# Patient Record
Sex: MALE | Race: WHITE | Employment: FULL TIME | URBAN - METROPOLITAN AREA
[De-identification: names, ages, dates, MRNs, and addresses within clinical notes are randomized per-mention and may not be internally consistent; named-entity substitution may affect disease eponyms.]

---

## 2019-07-23 PROBLEM — Z85.820 PERSONAL HISTORY OF MALIGNANT MELANOMA OF SKIN: Status: ACTIVE | Noted: 2019-07-23

## 2020-01-22 ENCOUNTER — OFFICE VISIT (OUTPATIENT)
Dept: FAMILY MEDICINE CLINIC | Facility: CLINIC | Age: 65
End: 2020-01-22
Payer: COMMERCIAL

## 2020-01-22 VITALS
WEIGHT: 179 LBS | HEART RATE: 64 BPM | SYSTOLIC BLOOD PRESSURE: 128 MMHG | OXYGEN SATURATION: 98 % | HEIGHT: 70 IN | BODY MASS INDEX: 25.62 KG/M2 | DIASTOLIC BLOOD PRESSURE: 70 MMHG | TEMPERATURE: 99 F

## 2020-01-22 DIAGNOSIS — J40 BRONCHITIS: Primary | ICD-10-CM

## 2020-01-22 PROCEDURE — 99202 OFFICE O/P NEW SF 15 MIN: CPT | Performed by: NURSE PRACTITIONER

## 2020-01-22 NOTE — PATIENT INSTRUCTIONS
Bronchitis, Antibiotic Treatment (Adult)    Bronchitis is an infection of the air passages (bronchial tubes) in your lungs. It often occurs when you have a cold.  This illness is contagious during the first few days and is spread through the air by coughi Follow up with your healthcare provider, or as advised. If you had an X-ray or ECG (electrocardiogram), a specialist will review it. You will be told of any new test results that may affect your care.   If you are age 72 or older, if you smoke, or if you ha Acute bronchitis almost always starts as a viral respiratory infection, such as a cold or the flu. Certain factors make it more likely for a cold or flu to turn into bronchitis.  These include being very young, being elderly, having a heart or lung problem, Follow up with your doctor as you are told. You will likely feel better in a week or two. But a dry cough can linger beyond that time.  Let your doctor know if you still have symptoms (other than a dry cough) after 2 weeks, or if you’re prone to getting bro

## 2020-01-22 NOTE — PROGRESS NOTES
CHIEF COMPLAINT:   Patient presents with:  Cold: dry cough pain in chest when coughing, x 4 days. fever of 100.7  since last night. HPI:   Bishop Medel is a 60year old male who presents for cough for  4  days.   Cough started gradually and is de GENERAL: well developed, well nourished,in no apparent distress  SKIN: no rashes, no suspicious lesions  EYES: Conjunctiva clear. No scleral icterus. HENT: Atraumatic, normocephalic. TM's clear bilaterally.   Nostrils patent, nasal mucosa pink and non-in · If your symptoms are severe, rest at home for the first 2 to 3 days. When you go back to your usual activities, don't let yourself get too tired. · Don't smoke. Also stay away from secondhand smoke.   · You may use over-the-counter medicines to control f · Weakness, drowsiness, headache, facial pain, ear pain, or a stiff neck  Call 911  Call 911 if any of these occur.   · Coughing up blood  · Weakness, drowsiness, headache, or stiff neck that get worse  · Trouble breathing, wheezing, or pain with breathing Treating acute bronchitis  Bronchitis usually clears up as the cold or flu goes away. You can help feel better faster by doing the following:  · Take medicine as directed. You may be told to take ibuprofen or other over-the-counter medicines.  These help re The patient indicates understanding of these issues and agrees to the plan. The patient is asked to return if sx's persist or worsen.

## 2021-05-27 ENCOUNTER — APPOINTMENT (OUTPATIENT)
Dept: GENERAL RADIOLOGY | Facility: HOSPITAL | Age: 66
DRG: 065 | End: 2021-05-27
Attending: EMERGENCY MEDICINE
Payer: COMMERCIAL

## 2021-05-27 ENCOUNTER — APPOINTMENT (OUTPATIENT)
Dept: CT IMAGING | Facility: HOSPITAL | Age: 66
DRG: 065 | End: 2021-05-27
Payer: COMMERCIAL

## 2021-05-27 ENCOUNTER — HOSPITAL ENCOUNTER (INPATIENT)
Facility: HOSPITAL | Age: 66
LOS: 7 days | Discharge: NURSING, REHAB OR LONG-TERM CARE | DRG: 065 | End: 2021-06-03
Attending: EMERGENCY MEDICINE | Admitting: HOSPITALIST
Payer: COMMERCIAL

## 2021-05-27 ENCOUNTER — APPOINTMENT (OUTPATIENT)
Dept: CT IMAGING | Facility: HOSPITAL | Age: 66
DRG: 065 | End: 2021-05-27
Attending: EMERGENCY MEDICINE
Payer: COMMERCIAL

## 2021-05-27 DIAGNOSIS — I16.1 HYPERTENSIVE EMERGENCY: Primary | ICD-10-CM

## 2021-05-27 DIAGNOSIS — I61.0 THALAMIC HEMORRHAGE (HCC): ICD-10-CM

## 2021-05-27 PROCEDURE — 99223 1ST HOSP IP/OBS HIGH 75: CPT | Performed by: HOSPITALIST

## 2021-05-27 PROCEDURE — 70496 CT ANGIOGRAPHY HEAD: CPT | Performed by: EMERGENCY MEDICINE

## 2021-05-27 PROCEDURE — 99291 CRITICAL CARE FIRST HOUR: CPT | Performed by: NURSE PRACTITIONER

## 2021-05-27 PROCEDURE — 71045 X-RAY EXAM CHEST 1 VIEW: CPT | Performed by: EMERGENCY MEDICINE

## 2021-05-27 PROCEDURE — 70498 CT ANGIOGRAPHY NECK: CPT | Performed by: EMERGENCY MEDICINE

## 2021-05-27 PROCEDURE — 70450 CT HEAD/BRAIN W/O DYE: CPT

## 2021-05-27 RX ORDER — DOCUSATE SODIUM 100 MG/1
100 CAPSULE, LIQUID FILLED ORAL 2 TIMES DAILY PRN
Status: DISCONTINUED | OUTPATIENT
Start: 2021-05-27 | End: 2021-06-03

## 2021-05-27 RX ORDER — BISACODYL 10 MG
10 SUPPOSITORY, RECTAL RECTAL
Status: DISCONTINUED | OUTPATIENT
Start: 2021-05-27 | End: 2021-06-03

## 2021-05-27 RX ORDER — ONDANSETRON 2 MG/ML
4 INJECTION INTRAMUSCULAR; INTRAVENOUS EVERY 6 HOURS PRN
Status: DISCONTINUED | OUTPATIENT
Start: 2021-05-27 | End: 2021-06-03

## 2021-05-27 RX ORDER — ACETAMINOPHEN 650 MG/1
650 SUPPOSITORY RECTAL EVERY 4 HOURS PRN
Status: DISCONTINUED | OUTPATIENT
Start: 2021-05-27 | End: 2021-06-03

## 2021-05-27 RX ORDER — LORAZEPAM 2 MG/ML
1 INJECTION INTRAMUSCULAR
Status: DISCONTINUED | OUTPATIENT
Start: 2021-05-27 | End: 2021-06-03

## 2021-05-27 RX ORDER — PHENYLEPHRINE HCL IN 0.9% NACL 50MG/250ML
PLASTIC BAG, INJECTION (ML) INTRAVENOUS CONTINUOUS PRN
Status: DISCONTINUED | OUTPATIENT
Start: 2021-05-27 | End: 2021-06-03

## 2021-05-27 RX ORDER — HYDRALAZINE HYDROCHLORIDE 20 MG/ML
10 INJECTION INTRAMUSCULAR; INTRAVENOUS EVERY 2 HOUR PRN
Status: DISCONTINUED | OUTPATIENT
Start: 2021-05-27 | End: 2021-06-03

## 2021-05-27 RX ORDER — METOCLOPRAMIDE HYDROCHLORIDE 5 MG/ML
10 INJECTION INTRAMUSCULAR; INTRAVENOUS EVERY 8 HOURS PRN
Status: DISCONTINUED | OUTPATIENT
Start: 2021-05-27 | End: 2021-06-03

## 2021-05-27 RX ORDER — HYDROMORPHONE HYDROCHLORIDE 1 MG/ML
0.5 INJECTION, SOLUTION INTRAMUSCULAR; INTRAVENOUS; SUBCUTANEOUS EVERY 30 MIN PRN
Status: ACTIVE | OUTPATIENT
Start: 2021-05-27 | End: 2021-05-27

## 2021-05-27 RX ORDER — SODIUM CHLORIDE 9 MG/ML
INJECTION, SOLUTION INTRAVENOUS CONTINUOUS
Status: DISCONTINUED | OUTPATIENT
Start: 2021-05-27 | End: 2021-05-28

## 2021-05-27 RX ORDER — LABETALOL HYDROCHLORIDE 5 MG/ML
10 INJECTION, SOLUTION INTRAVENOUS EVERY 10 MIN PRN
Status: DISCONTINUED | OUTPATIENT
Start: 2021-05-27 | End: 2021-06-03

## 2021-05-27 RX ORDER — ACETAMINOPHEN 325 MG/1
650 TABLET ORAL EVERY 4 HOURS PRN
Status: DISCONTINUED | OUTPATIENT
Start: 2021-05-27 | End: 2021-06-03

## 2021-05-27 RX ORDER — ONDANSETRON 2 MG/ML
4 INJECTION INTRAMUSCULAR; INTRAVENOUS EVERY 4 HOURS PRN
Status: DISCONTINUED | OUTPATIENT
Start: 2021-05-27 | End: 2021-05-27

## 2021-05-27 RX ORDER — POLYETHYLENE GLYCOL 3350 17 G/17G
17 POWDER, FOR SOLUTION ORAL DAILY PRN
Status: DISCONTINUED | OUTPATIENT
Start: 2021-05-27 | End: 2021-06-03

## 2021-05-28 ENCOUNTER — APPOINTMENT (OUTPATIENT)
Dept: CT IMAGING | Facility: HOSPITAL | Age: 66
DRG: 065 | End: 2021-05-28
Attending: EMERGENCY MEDICINE
Payer: COMMERCIAL

## 2021-05-28 ENCOUNTER — APPOINTMENT (OUTPATIENT)
Dept: CV DIAGNOSTICS | Facility: HOSPITAL | Age: 66
DRG: 065 | End: 2021-05-28
Attending: EMERGENCY MEDICINE
Payer: COMMERCIAL

## 2021-05-28 PROCEDURE — 99291 CRITICAL CARE FIRST HOUR: CPT | Performed by: INTERNAL MEDICINE

## 2021-05-28 PROCEDURE — 70450 CT HEAD/BRAIN W/O DYE: CPT | Performed by: NURSE PRACTITIONER

## 2021-05-28 PROCEDURE — 93306 TTE W/DOPPLER COMPLETE: CPT | Performed by: NURSE PRACTITIONER

## 2021-05-28 PROCEDURE — 99233 SBSQ HOSP IP/OBS HIGH 50: CPT | Performed by: HOSPITALIST

## 2021-05-28 RX ORDER — LISINOPRIL 10 MG/1
10 TABLET ORAL DAILY
Status: DISCONTINUED | OUTPATIENT
Start: 2021-05-28 | End: 2021-06-03

## 2021-05-28 RX ORDER — POTASSIUM CHLORIDE 20 MEQ/1
40 TABLET, EXTENDED RELEASE ORAL ONCE
Status: COMPLETED | OUTPATIENT
Start: 2021-05-28 | End: 2021-05-28

## 2021-05-28 RX ORDER — ATORVASTATIN CALCIUM 10 MG/1
10 TABLET, FILM COATED ORAL NIGHTLY
Status: DISCONTINUED | OUTPATIENT
Start: 2021-05-28 | End: 2021-06-03

## 2021-05-28 RX ORDER — ENOXAPARIN SODIUM 100 MG/ML
40 INJECTION SUBCUTANEOUS DAILY
Status: DISCONTINUED | OUTPATIENT
Start: 2021-05-28 | End: 2021-06-03

## 2021-05-28 NOTE — CONSULTS
[facility]  Neurocritical Care Consult Note    Nasir Chandler Patient Status:  Inpatient    1955 MRN [id number]   [facility] 6NE-A Attending Fabricio Alvarado MD   Hosp Day # 1 PCP Keiko Duque MD       Reason for C mg, 650 mg, Rectal, Q4H PRN  docusate sodium (COLACE) cap 100 mg, 100 mg, Oral, BID PRN  ondansetron HCl (ZOFRAN) injection 4 mg, 4 mg, Intravenous, Q6H PRN   Or  Metoclopramide HCl (REGLAN) injection 10 mg, 10 mg, Intravenous, Q8H PRN  LORazepam (ATIVAN) droop  · Motor- 5/5 on L, RUE 3/5, RLE 4/5  · Sens-  Dec to light touch on R    Diagnostics:   CT BRAIN OR HEAD (29592)    Result Date: 5/28/2021  CONCLUSION:  1.  No interval change in the size or appearance of an acute hemorrhage identified centered in th vertebral or carotid arteries. No evidence of hemodynamically significant carotid stenosis by NASCET criteria. Please see above for further details. Critical results were discussed with Dr. Viridiana Hankins at [facility] on 5/27/2021.  Critical results were read

## 2021-05-28 NOTE — PROGRESS NOTES
[facility]  Neurocritical Care APRN Progress Note    NAME: Jose Antonio Dhillon - ROOM: 66/5687-R - MRN: [id number] - Age: 63year old - [facility]:8/[birth year]/6950    History Of Present Illness:  Jose Antonio Dhillon is a 63year old male with PMHx significant some mild continued left arm soreness but no other issues after.      PMH:  Past Medical History:   Diagnosis Date   • Basal cell carcinoma 2009    right chest   • Dysplastic nevus 11/15/13    right back   • Dysplastic nevus 2/23/16    right upper back   • bilaterally. The rest of the cranial nerves appear grossly intact. Sensation:  Able to feel light touch bilaterally but diminished on right side, most notable in right arm.   Motor: RUE drift not fully to the bed, RLE mild drift but able to hold off bed, 5/27/2021 at 7:38 PM     Finalized by (CST): Jazmine Kwok MD on 5/27/2021 at 7:43 PM       Labs:  Lab Results   Component Value Date    WBC 8.1 05/27/2021    HGB 15.1 05/27/2021    HCT 44.4 05/27/2021    .0 05/27/2021    CREATSERUM 1.23 05/27/202 [facility]: 94595  [facility]: 6468    A total of 35 minutes of critical care time (exclusive of billable procedures) was administered.  This involved direct patient intervention, complex decision making, and/or extensive discussions with the patient, family,

## 2021-05-28 NOTE — PAYOR COMM NOTE
--------------  ADMISSION REVIEW     Payor: [address] #:  [id number]  Authorization Number: [id number]    Admit date: 5/27/21  Admit time:  9:17 PM     Patient Seen in: [facility] Emergency Department    History against gravity, he is able to hold his right leg against gravity but with great difficulty, no speech or visual disturbance     Labs Reviewed   PTT, ACTIVATED - Abnormal; Notable for the following components:       Result Value    PTT 24.0 (*)     All oth slowly bring his blood pressure down and while in the emergency department we will keep his systolics close to 711. Patient was reexamined multiple times and shows no progression. [facility]   Patient was s MCV 87.6   .0   INR 0.93     PTP 12.8   INR 0.93     ASSESSMENT / PLAN:     1. Left thalamic bleed secondary to hypertensive emergency  1. Stroke protocol  2. Neuro and Neuro Sx on consult  3. ECHO  4. BP control  5. Avoid ASA/ anticoagulants  6. Talha Borrego RN    5/27/2021 1938 Rate/Dose Change 7.5 mg/hr Intravenous Julia Diaz RN    5/27/2021 1922 New Bag 20,000 mcg (none[facility]      0.9% NaCl infusion     Date Action Dose Route User    5/28/2021 0000 Rate/Dose Verify (non

## 2021-05-28 NOTE — OCCUPATIONAL THERAPY NOTE
OCCUPATIONAL THERAPY EVALUATION - INPATIENT     Room Number: 0164/3466-B  Evaluation Date: 5/28/2021  Type of Evaluation: Initial  Presenting Problem: L thalamic ICH    Physician Order: IP Consult to Occupational Therapy  Reason for Therapy: ADL/IADL Dysfu COGNITION  Initiation: hand over hand to initiate tasks  Motor Planning: impaired  Awareness of Errors:  assistance required to identify errors made and assistance required to correct errors made  Awareness of Deficits:  decreased awareness of deficits Rest: 97    ACTIVITIES OF DAILY LIVING ASSESSMENT  AM-PAC ‘6-Clicks’ Inpatient Daily Activity Short Form  How much help from another person does the patient currently need…  -   Putting on and taking off regular lower body clothing?: A Lot  -   Bathing (in used simple words to communicate most of the session. Word finding difficulty. Chair alarm on. Encouraged the pt to use L hand to guide R hand during simple ADL (reaching for a cup, etc). Promoted the importance of bilateral integration.   Pt and his cristhian Decision Making HIGH - Analysis of occupational profile, comprehensive assessments, multiple treatment options    Overall Complexity MODERATE     OT Discharge Recommendations: Acute rehabilitation  OT Device Recommendations: TBD    PLAN  OT Treatment Plan:

## 2021-05-28 NOTE — CONSULTS
[address] Patient Status:  Inpatient    1955 MRN [id number]   [facility] 6NE-A Attending Devaughn Koch MD   Hosp Day # 1 PCP Marina Rosales MD     Patient Identification  Angie Stovall is a 63year old male. not be accurately estimated. Follow up CT 5/28/21 CONCLUSION:     1.  No interval change in the size or appearance of an acute hemorrhage identified centered in the left thalamus with surrounding low-density edema. Sharlee Thorpe is mild local mass effect upon (TYLENOL) 650 MG rectal suppository 650 mg, 650 mg, Rectal, Q4H PRN  docusate sodium (COLACE) cap 100 mg, 100 mg, Oral, BID PRN  ondansetron HCl (ZOFRAN) injection 4 mg, 4 mg, Intravenous, Q6H PRN   Or  Metoclopramide HCl (REGLAN) injection 10 mg, 10 mg, I data filed at 5/28/2021 1130  Gross per 24 hour   Intake 1556 ml   Output 1875 ml   Net -319 ml       Physical Exam:                                      General: Alert, cooperative, no distress, appears stated age.   Head:  Normocephalic, with facial droop management, bleeding risk,  prevention of pressure ulcer, prevention of aspiration pneumonia. Patient would benefit and is able to participate in 3 hours of therapy daily.  Patient is anticipated to discharge to home when acute inpatient rehabilitation

## 2021-05-28 NOTE — PROGRESS NOTES
[facility]  Progress Note     Dakota Carreno Patient Status:  Inpatient    1955 MRN [id number]   [facility] 6NE-A Attending Diana Hernandez MD   Hosp Day # 1 PCP Clarke Nichole MD     Chief Complaint: elevated blood pressure 05/27/21  1858   PTP 12.8   INR 0.93            COVID-19 Lab Results    COVID-19  Lab Results   Component Value Date    COVID19 Not Detected 05/27/2021       Pro-Calcitonin  No results for input(s): PCT in the last 168 hours.     Cardiac  Recent Labs   Lab

## 2021-05-28 NOTE — SLP NOTE
ADULT SWALLOWING EVALUATION    ASSESSMENT    ASSESSMENT/OVERALL IMPRESSION:  Patient seen for swallowing evaluation per stroke protocol. Patient admitted after developing right sided weakness while exercising. Imaging revealed L thalamic hemorrhage.   His Expressive aphasia      Past Medical History  Past Medical History:   Diagnosis Date   • Basal cell carcinoma 2009    right chest   • Dysplastic nevus 11/15/13    right back   • Dysplastic nevus 2/23/16    right upper back   • Melanoma in situ [facility]) 3/26/2 Phase of Swallow: No complaints consistent with possible esophageal involvement            GOALS  Goal #1 Patient will participate in communication assessment  In Progress   Goal #2 Patient will participate in ADRS    In [address] [place] [address]

## 2021-05-28 NOTE — ED PROVIDER NOTES
Patient Seen in: [facility] Emergency Department      History   Patient presents with:  Stroke    Stated Complaint: Stroke     HPI/Subjective:   HPI    28-year-old male presents to the emerge department with sudden onset of right upper extremity wea Chaperone present: Nurses at bedside. Constitutional:       General: He is not in acute distress. Appearance: Normal appearance. He is well-developed. HENT:      Head: Normocephalic and atraumatic.    Cardiovascular:      Rate and Rhythm: Normal rat ------                     CBC W/ DIFFERENTIAL[[id number]]                              Final result                 Please view results for these tests on the individual orders.    RAPID SARS-COV-2 BY PCR   CBC W/ DIFFERENTIAL     EKG    Rate, intervals and at 7:12 PM     Finalized by (CST): Yessica Grande MD on 5/27/2021 at 7:13 PM       CT STROKE CTA BRAIN/CTA NECK (W IV)(CPT=70496/44345)    Result Date: 5/27/2021  PROCEDURE:  CT STROKE CTA BRAIN/CTA NECK (W IV)(CPT=70496/96522)  COMPARISON:  None.   Rachel Chandler unremarkable. The common carotid arteries are widely patent. Mild mixed plaque in both carotid bulbs. There is no evidence of hemodynamically significant stenosis in the carotid bulbs by NASCET criteria.   The cervical internal carotid arteries are widel and shows no progression. I contacted the [facility] as well as neurosurgery. Patient was seen by the [facility] in the emergency department. I reviewed all the findings with the patient as well.   Patient will be admitted to the neuro IC

## 2021-05-28 NOTE — CM/SW NOTE
PT recommending acute rehab. PMR consult placed. Await determination. Referrals to in network acute rehabs sent via Aidin.

## 2021-05-28 NOTE — CONSULTS
[facility]  Neurosurgery Consult    Ramy Lawson Patient Status:  Inpatient    1955 MRN [id number]   [facility] 6NE-A Attending Lena Dey MD   Hosp Day # 1 PCP Magy Triplett MD     REASON FOR CONSULTATION:  L thalamic IPH (TYLENOL) 650 MG rectal suppository 650 mg, 650 mg, Rectal, Q4H PRN  docusate sodium (COLACE) cap 100 mg, 100 mg, Oral, BID PRN  ondansetron HCl (ZOFRAN) injection 4 mg, 4 mg, Intravenous, Q6H PRN   Or  Metoclopramide HCl (REGLAN) injection 10 mg, 10 mg, I 05/27/2021    PGLU 93 05/28/2021       IMAGING:  CT brain 5/27 1908  FINDINGS:   Ventricles and sulci are within normal limits.  There is no midline shift.  The basal cisterns are patent.  The gray-white matter differentiation is intact.       There is acu consistent with underlying chronic   microvascular ischemic change. SINUSES:           No sign of acute sinusitis.     MASTOIDS:          No sign of acute inflammation. SKULL:             No evidence for fracture or osseous abnormality.    OTHER:

## 2021-05-28 NOTE — PHYSICAL THERAPY NOTE
PHYSICAL THERAPY EVALUATION - INPATIENT     Room Number: 3992/2576-H  Evaluation Date: 5/28/2021  Type of Evaluation: Initial  Physician Order: PT Eval and Treat    Presenting Problem: L thalamic hematoma  Reason for Therapy: Mobility Dysfunction and Tearful at times. Patient self-stated goal is - to regain strength on R side.     OBJECTIVE  Precautions: Seizure;Bed/chair alarm ( to 150)  Fall Risk: High fall risk    WEIGHT BEARING RESTRICTION  Weight Bearing Restriction: None                P bedclothes, sheets and blankets)?: A Little   -   Sitting down on and standing up from a chair with arms (e.g., wheelchair, bedside commode, etc.): A Lot   -   Moving from lying on back to sitting on the side of the bed?: A Little   How much help from anot Pt completed gait once again outside of room 12'x1 w/ mod a of 2 and chair follow. Pt w/ narrow maurice, flexed knees and b foot drag. Each step w/ r le was unpredictable - at times to large a step other times a drag. Motor control difficult on R>L.     Exerc assistance     Goal #3 Patient is able to ambulate 50 feet with assist device: walker - rolling at assistance level: moderate assistance     Goal #4    Goal #5    Goal #6    Goal Comments: Goals established on 5/28/2021

## 2021-05-28 NOTE — H&P
[facility]  History and Physical     Mikey Jeffries Patient Status:  Emergency    1955 MRN [id number]   Location [address] [facility] Attending Dana Nolan MD   Hosp Day # 0 PCP Caterina Jiménez MD     Chief Complaint: th No acute distress. Alert and oriented x 3. HEENT: Normocephalic atraumatic. Moist mucous membranes. EOM-I. PERRLA. Anicteric. Neck: No lymphadenopathy. No JVD. No carotid bruits. Respiratory: Clear to auscultation bilaterally. No wheezes. No rhonchi.   C Marilia Barkley MD  5/27/2021

## 2021-05-28 NOTE — PLAN OF CARE
Assumed patient care this am. Patient alert, oriented x4. Right side weakness and decreased sensation on right side. Right facial droop and expressive aphasia. Up in halls and up to chair with PT/OT, assist x2.  Patient woken up at 1400 for neuro exam and d

## 2021-05-28 NOTE — ED QUICK NOTES
Unable to start IV x3 at launch pad, brought to CT for plain brain and ultrasound guidied IV placed to L upper arm afterwards for rest of imaging.  Pt having increasing dysarthria while going to CT

## 2021-05-28 NOTE — CM/SW NOTE
Spoke to patients spouse re: acute rehab recommendation. She would like to have the available facility list emailed to [email or web address]

## 2021-05-29 PROCEDURE — 99291 CRITICAL CARE FIRST HOUR: CPT | Performed by: INTERNAL MEDICINE

## 2021-05-29 PROCEDURE — 99232 SBSQ HOSP IP/OBS MODERATE 35: CPT | Performed by: HOSPITALIST

## 2021-05-29 NOTE — PHYSICAL THERAPY NOTE
PHYSICAL THERAPY TREATMENT NOTE - INPATIENT    Room Number: 7642/1614-J     Session:1  Number of Visits to Meet Established Goals: 5    Presenting Problem: L thalamic hematoma    Problem List  Principal Problem:    Thalamic hemorrhage ([address]  Active Proble (AM-PAC Scale): 40.78   CMS Modifier (G-Code): CK    FUNCTIONAL ABILITY STATUS  Gait Assessment   Gait Assistance: Maximum assistance (2PA)  Distance (ft): 25,15  Assistive Device: Rolling walker  Pattern: R Foot drag;R Flexed knee; Ataxic  Stoop/Curb [employer] Goal #1 Patient is able to demonstrate supine - sit EOB @ level: supervision      Goal #2 Patient is able to demonstrate transfers EOB to/from [facility] at assistance level: minimum assistance      Goal #3 Patient is able to ambulate 50 feet with assist device

## 2021-05-29 NOTE — PLAN OF CARE
Assumed care at 1230. Pt tx from CCU to 7625. Pt A/O x4 with expressive aphasia. RA. NSR on tele. VSS. Denies any pain. Neuro checks q4, see flow sheets. Up x1, stand/pivot to the chair. Walked with PT in the dyson, PT recommending Acute rehab.  Pt family at

## 2021-05-29 NOTE — SLP NOTE
SPEECH/LANGUAGE/COGNITIVE EVALUATION - INPATIENT    Admission Date: 5/27/2021  Evaluation Date: 05/29/21    Reason for Referral: Stroke protocol    ASSESSMENT & PLAN   ASSESSMENT & IMPRESSION   Patient is a 63year old male who sustained a Thalamic hemorrh production strategies training to improve safety, independence and for return to previous level of communication. Acute rehabilitation is recommended upon discharge. Further, more in-depth assessment of language skills is recommended at next level of care. rehabilitation    Patient Experiencing Pain: No                           GOALS  Goal #1 Patient will be educated on word-retrieval strategies and express moderately-complex word tasks with 80% acc given mod (A)  Goal Established       Goal #2 Evaluate jagdish

## 2021-05-29 NOTE — PROGRESS NOTES
[facility]  Progress Note     Deondre Hardin Patient Status:  Inpatient    1955 MRN [id number]   [facility] 6NE-A Attending Pasquale Ghotra MD   Hosp Day # 2 PCP Monty Melendez MD     Chief Complaint: elevated blood pressure COVID-19 Lab Results    COVID-19  Lab Results   Component Value Date    COVID19 Not Detected 05/27/2021       Pro-Calcitonin  No results for input(s): PCT in the last 168 hours.     Cardiac  Recent Labs   Lab 05/27/21  1858   TROP <0.045       Creatinin

## 2021-05-29 NOTE — PLAN OF CARE
Assumed care of patient at 1900, patient alert and orientated x4 with some expressive aphasia. Pt sitting up in chair, patient back to bed with 2 RN assist, gait belt and turn and pivot only. Neuro checks q 4 hours. Able to follow commands, vital stable.

## 2021-05-29 NOTE — PROGRESS NOTES
Received patient at 0730  A/O x 4  Expressive aphasia and r sided weakness, and decreased sensation  Slight R facial droop  Patient up to chair with assistance  [facility] 8  Patient eating/drinking  Transfer orders in place  Report called to Jaya Love on CTU 7

## 2021-05-29 NOTE — PROGRESS NOTES
[facility]  Neurocritical Care Progress Note     Rachel Kendall Patient Status:  Inpatient    1955 MRN [id number]   [facility] 6NE-A Attending Annie Chang MD   Hosp Day # 2 PCP Neo Matta MD     Subjective: suppository 10 mg, 10 mg, Rectal, Daily PRN      Physical Examination:   General- No acute distress  CV- RRR  Resp- CTA Bilat  Neuro-  · Mental status- awake and alert, regards and follows commands, oriented x3, speech dysarthric, naming 3/3  · Cranial ner

## 2021-05-30 PROCEDURE — 99232 SBSQ HOSP IP/OBS MODERATE 35: CPT | Performed by: HOSPITALIST

## 2021-05-30 PROCEDURE — 99233 SBSQ HOSP IP/OBS HIGH 50: CPT | Performed by: OTHER

## 2021-05-30 NOTE — PLAN OF CARE
Assumed pt care at 0730  VSS, A&Ox4  Neuros Q4, right sided weakness, expressive aphasia  Up in chair  DC planning, patient preferring Thermon Patricia- insurance auth to be sent on Tuesday  POC discussed with family and pt  Needs attended to

## 2021-05-30 NOTE — PLAN OF CARE
Assumed pt care at [address] [place] [address] for the night shift. Pt resting in bed in no apparent distress. Neuro assessment unchanged. VSS. Low grade temp overnight. Encouraged deep breathing and coughing. Voids per urinal.Remains on bedrest.  All safety precautions in place.

## 2021-05-30 NOTE — PROGRESS NOTES
[facility]  Progress Note     Nasir Chandler Patient Status:  Inpatient    1955 MRN [id number]   [facility] 6NE-A Attending Fabricio Alvarado MD   [facility] Day # 3 PCP Keiko Duque MD     Chief Complaint: elevated blood pressure in this interval not displayed. Estimated Creatinine Clearance: 58.4 mL/min (based on SCr of 1.21 mg/dL).     Recent Labs   Lab 05/27/21  1858   PTP 12.8   INR 0.93            COVID-19 Lab Results    COVID-19  Lab Results   Component Value Date    COV

## 2021-05-30 NOTE — PROGRESS NOTES
[id number] [address] Neurology Progress Note    [facility] Patient Status:  Inpatient    1955 MRN [id number]   [facility] 7NE-A Attending Ginny Peña MD   [facility] Day # 3 PCP Lashanda Cortes MD       [facility]      Neurology did have a moment of perseverating on what a watch is for when answering what a pen is for, but was able to correct himself. No slurred speech noted. Able to follow simple commands. Face is symmetrical. PERRL. EOMI. VFF. Tongue midline.  Shoulder shrug n goal 100-150mmHg  · PT/OT recommends Acute Rehab  · NS followed  · HTN    Patient with left thalamic ICH in setting of elevated BP. Plan for Acute Rehab on DC and SW to speak with family. Dr. Brandin Oates to follow.     EUGENE Reno Huge

## 2021-05-30 NOTE — CM/SW NOTE
MSW spoke with the patient's wife Sultana Raines who informed MSW she would like Heavy. MSW reserved via Aidin. MSW informed Von Every from [facility] via [address] to please request Nannette Kaminski first thing Tuesday morning.   The patient's wife would like

## 2021-05-30 NOTE — PROGRESS NOTES
SPEECH DAILY NOTE - INPATIENT    ASSESSMENT & PLAN   ASSESSMENT    The patient was seen for skilled speech therapy as per plan of care. Patient received in room, seated upright in bed. Pleasant and cooperative for the therapy session.  Patient demonstrated tasks with 80% acc given mod (A)  Goal Established         Goal #2 Evaluate reading comprehension     GOAL MET      Goal #3 Evaluate written expression GOAL MET   Goal #4 Patient will complete moderately complex functional reading comprehension activities

## 2021-05-31 PROCEDURE — 99232 SBSQ HOSP IP/OBS MODERATE 35: CPT | Performed by: OTHER

## 2021-05-31 PROCEDURE — 99232 SBSQ HOSP IP/OBS MODERATE 35: CPT | Performed by: HOSPITALIST

## 2021-05-31 NOTE — PROGRESS NOTES
[facility]  Progress Note     Nasir Chandler Patient Status:  Inpatient    1955 MRN [id number]   [facility] 6NE-A Attending Fabricio Alvarado MD   [facility] Day # 4 PCP Keiko Duque MD     Chief Complaint: elevated blood pressure values in this interval not displayed. Estimated Creatinine Clearance: 58.4 mL/min (based on SCr of 1.21 mg/dL).     Recent Labs   Lab 05/27/21  1858   PTP 12.8   INR 0.93            COVID-19 Lab Results    COVID-19  Lab Results   Component Value Date

## 2021-05-31 NOTE — PROGRESS NOTES
Neurology Progress Note    Natacha Cord Patient Status:  Inpatient    1955 MRN [id number]   [facility] 7NE-A Attending Radha Peña MD   [facility] Day # 4 PCP Avery Wolff MD       Chief Complaint:  L ICH hemmorhage      Subjective

## 2021-05-31 NOTE — PLAN OF CARE
Assumed care of pt at 1900. Pt AOx4. RA.  .  NSR. Pt no c/o pain. Neuro Q4. DC planning, pt moises Falcon, insurance auth to be sent Tuesday. Will continue to monitor.

## 2021-06-01 PROCEDURE — 99232 SBSQ HOSP IP/OBS MODERATE 35: CPT | Performed by: HOSPITALIST

## 2021-06-01 RX ORDER — ATORVASTATIN CALCIUM 10 MG/1
10 TABLET, FILM COATED ORAL NIGHTLY
Qty: 90 TABLET | Refills: 0 | Status: SHIPPED | OUTPATIENT
Start: 2021-06-01

## 2021-06-01 RX ORDER — LISINOPRIL 10 MG/1
10 TABLET ORAL DAILY
Qty: 90 TABLET | Refills: 0 | Status: SHIPPED | OUTPATIENT
Start: 2021-06-01 | End: 2021-10-18 | Stop reason: DRUGHIGH

## 2021-06-01 NOTE — PLAN OF CARE
Assumed care at [address]. Denies pain/discomfort. Neuro checks q4h. See charting for full assessment.       Problem: Patient/Family Goals  Goal: Patient/Family Long Term Goal  Description: Patient's Long Term Goal: return to baseline speech and mobility    In call for assistance with activity based on assessment  Outcome: Progressing  Goal: Achieves maximal functionality and self care  Description: INTERVENTIONS  - Monitor swallowing and airway patency with patient fatigue and changes in neurological status  -

## 2021-06-01 NOTE — OCCUPATIONAL THERAPY NOTE
OCCUPATIONAL THERAPY TREATMENT NOTE - INPATIENT     Room Number: 1915/6170-W  Session: 1  Number of Visits to Meet Established Goals: 7    Presenting Problem: L thalamic ICH    History related to current admission:    Patient was admitted from the gym on 5 Putting on and taking off regular upper body clothing?: A Little  -   Taking care of personal grooming such as brushing teeth?: A Little  -   Eating meals?: A Little    AM-PAC Score:  Score: 15  Approx Degree of Impairment: 56.46%  Standardized Score (AM-P to participate in therapy and demonstrates excellent rehab potential.  Recommend acute rehab when stable for hospital discharge.        OT Discharge Recommendations: Acute rehabilitation  OT Device Recommendations: TBD    PLAN  OT Treatment Plan: Balance ac

## 2021-06-01 NOTE — DISCHARGE SUMMARY
[facility] PSYCHIATRIC [place] HOSPITALIST  DISCHARGE SUMMARY     Nasirrhea Smithchacorta Patient Status:  Inpatient    1955 MRN [id number]   [facility] 7NE-A Attending Dr. Diane Mayen Day # 6 PCP Keiko Duque MD     Date of Admission: 2021  Date of Dis from the hospital.     Procedures during hospitalization:  none    Consultants:  • Neurology, neurosurgery.          Discharge Medications        START taking these medications        Instructions Prescription details   atorvastatin 10 MG Tabs  Commonly kno dysarthria. He was found to have left forearm hemorrhage. He was significantly hypertensive on arrival which improved with Cardene drip. BP remained stable on lisinopril. PT/OT evaluated patient and recommend acute rehab.   Strength and dysarthria did i

## 2021-06-01 NOTE — PLAN OF CARE
Assumed care of patient at 0730. Patient resting in bed comfortably. No c/o pain. Patient up to chair. Neuro assessed. Patient and family updated on plan of care. Patient awaiting SRAL placement by SW. Patient requesting SRAL. Needs met.  Will continue to m

## 2021-06-01 NOTE — SLP NOTE
SPEECH DAILY NOTE - INPATIENT    ASSESSMENT & PLAN   ASSESSMENT  Patient seen for communication therapy. Patient verbal expression much improved compared to my initial visit.   He verbalizes frustration with deficits but also acknowledges interval improvem Established Goals: 5    Session: 2 of 5    If you have any questions, please contact Elpidio Hilario CCC-SLP  Pager [phone]

## 2021-06-01 NOTE — CM/SW NOTE
Pt has been cleared for dc and [facility] has insurance auth for pt to come to them for AR. However, pt really wants to go to [facility] for [address] [place]. Asked Yaquelin at [facility] to relinquish the insurance auth so [facility] can submit for it.   Spoke with Citlaly Laguerre at [facility] who said

## 2021-06-01 NOTE — PLAN OF CARE
Assumed care of patient at 0730. Patient resting in bed comfortably. No c/o pain. Patient up to chair. Neuro assessed every 4 hours. Patient and family updated on plan of care. Patient awaiting SRAL placement by DARIUS. Needs met. Will continue to monitor.

## 2021-06-01 NOTE — PROGRESS NOTES
[facility] H[facility]PITAL[facility]  Progress Note     Mian Vang Patient Status:  Inpatient    1955 MRN [id number]   [facility] 6NE-A Attending Marc Horne MD   [facility] Day # 5 PCP Madai Hendrix MD     Chief Complaint: elevated blood pressure --    TP 7.7  --   --   --   --     < > = values in this interval not displayed. Estimated Creatinine Clearance: 58.4 mL/min (based on SCr of 1.21 mg/dL).     Recent Labs   Lab 05/27/21  1858   PTP 12.8   INR 0.93            COVID-19 Lab Results    CO

## 2021-06-01 NOTE — PHYSICAL THERAPY NOTE
PHYSICAL THERAPY TREATMENT NOTE - INPATIENT    Room Number: 4110/9792-P     Session:2  Number of Visits to Meet Established Goals: 5    Presenting Problem: L thalamic hematoma     History related to current admission: Patient was admitted from the gym on bedclothes, sheets and blankets)?: None   -   Sitting down on and standing up from a chair with arms (e.g., wheelchair, bedside commode, etc.): A Little   -   Moving from lying on back to sitting on the side of the bed?: A Little   How much help from [facility] addressed; Alarm set; Family present    ASSESSMENT   Pt demonstrates progress in function and activity tolerance.  Pt continues to have hemiparesis of R LE/UE, impaired balance in standing, decreased endurance, dec coordination, dec motor planning and control

## 2021-06-02 PROCEDURE — 99232 SBSQ HOSP IP/OBS MODERATE 35: CPT | Performed by: INTERNAL MEDICINE

## 2021-06-02 NOTE — OCCUPATIONAL THERAPY NOTE
OCCUPATIONAL THERAPY TREATMENT NOTE - INPATIENT     Room Number: 7274/2732-B  Session: 2     Number of Visits to Meet Established Goals: 7    Presenting Problem: L thalamic ICH    History related to current admission:   Patient was admitted from the gym on which includes using toilet, bedpan or urinal? : A Lot  -   Putting on and taking off regular upper body clothing?: A Little  -   Taking care of personal grooming such as brushing teeth?: A Little  -   Eating meals?: None    AM-PAC Score:  Score: 16  Appro session    Patient End of Session: Up in chair;Needs met;Call light within reach;RN aware of session/findings; All patient questions and concerns addressed; Alarm set    ASSESSMENT   Patient presents with increasing function of RUE.  Patient is limited by josé

## 2021-06-02 NOTE — DIETARY NOTE
[facility]     Admitting diagnosis:  Thalamic hemorrhage ([facility] [address] [I61.0]  Hypertensive emergency [I16.1]    Ht: 170.2 cm (5' 7\")  Wt: 86.6 kg (191 lb). Body mass index is 29.91 kg/m².   IBW: 67.3 kg    Wt Reading

## 2021-06-02 NOTE — PLAN OF CARE
Assumed care at [phone]. Alert and oriented x4. Telemetry monitor reading SR. Neuro checks remain unchanged. No pain. Fall and seizure precautions maintained per protocol. Plan for Rob Vazquez. Pending. Call light in reach at bedside.  Will continue to mo needed  - Reorient patient post seizure  - Seizure pads on all 4 side rails  - Instruct patient/family to notify RN of any seizure activity  - Instruct patient/family to call for assistance with activity based on assessment  Outcome: Progressing  Goal: Ach

## 2021-06-02 NOTE — SLP NOTE
SPEECH DAILY NOTE - INPATIENT    ASSESSMENT & PLAN   ASSESSMENT  Patient seen for communication therapy focusing on expressive language through semantic feature analysis. SLP presented color pictures along with visual template to guide patient in SFA.   Pa 5    If you have any questions, please contact Catracho Lao 87 CCC-SLP  Pager [phone]

## 2021-06-02 NOTE — PLAN OF CARE
Assumed care of patient at 0700  A/Ox4, RA, NSR on tele  Neuros Qshift, no new deficits  Patient obtained insurance auth for [employer]  Need COVID PCR prior to discharge, awaiting results  Denies pain  Patient to transfer by [facility] tomorrow  Patient an status  Outcome: Adequate for Discharge  Goal: Remains free of injury related to seizure activity  Description: INTERVENTIONS:  - Maintain airway, patient safety  and administer oxygen as ordered  - Monitor patient for seizure activity, document and report Adequate for Discharge

## 2021-06-02 NOTE — PROGRESS NOTES
[facility]  Progress Note     Angie Stovall Patient Status:  Inpatient    1955 MRN [id number]   [facility] [place] 6NE-A Attending Jerrod De Paz 41 Day # 6 PCP Marina Rosales MD     Chief Complaint: elevated blood pressure    S: --   --   --    TP 7.7  --   --   --   --     < > = values in this interval not displayed. Estimated Creatinine Clearance: 55.2 mL/min (based on SCr of 1.28 mg/dL).     Recent Labs   Lab 05/27/21  1858   PTP 12.8   INR 0.93            COVID-19 Lab Res

## 2021-06-02 NOTE — PLAN OF CARE
Assumed care of pt.  At 1930  A&O x 4- neuro checks q4hr  Room Air  Right sided weakness  Slight slur of words and expressive aphasia  NSR on telemetry  SBP goal 100-150  No c/o pain  Plan to go to [facility]  Will continue to monitor

## 2021-06-02 NOTE — PHYSICAL THERAPY NOTE
PHYSICAL THERAPY TREATMENT NOTE - INPATIENT    Room Number: 9885/8062-Q     Session: 3   Number of Visits to Meet Established Goals: 5    Presenting Problem: L thalamic hematoma     History related to current admission: Patient was admitted from the gym o Sitting down on and standing up from a chair with arms (e.g., wheelchair, bedside commode, etc.): A Little   -   Moving from lying on back to sitting on the side of the bed?: A Little   How much help from another person does the patient currently need. .. updated with safe dc plan to AR. Standardized Assessment    BP    154/92  161/123    Denied dizziness or nausea with activity.      Axillary Transfers/Environmental Barriers    Toilet/Commode Transfers: Min A  Stairs: NA  Curb Step: NA        Patient En

## 2021-06-02 NOTE — CM/SW NOTE
Bettyjo Frankel from [facility] [place] [phone] said they accepted pt for AR but they need a non-rapid-Covid test which was drawn but the results will not be back until late today or tomorrow morning.   SRAL will not take pt with a rapid Covid or even knowing that h

## 2021-06-03 VITALS
OXYGEN SATURATION: 100 % | DIASTOLIC BLOOD PRESSURE: 71 MMHG | SYSTOLIC BLOOD PRESSURE: 114 MMHG | HEIGHT: 67 IN | HEART RATE: 60 BPM | WEIGHT: 191 LBS | RESPIRATION RATE: 16 BRPM | TEMPERATURE: 98 F | BODY MASS INDEX: 29.98 KG/M2

## 2021-06-03 PROCEDURE — 99239 HOSP IP/OBS DSCHRG MGMT >30: CPT | Performed by: INTERNAL MEDICINE

## 2021-06-03 NOTE — CM/SW NOTE
Results of COVID test sent to [facility].     Mee Cope LCSW  /Discharge Planner  [phone]

## 2021-06-03 NOTE — PLAN OF CARE
Assumed care of patient at 0700  A/OX4, RA, NSR on tele  No new neuro deficits  COVID PVR negative  Patient clear to be discharged from all standpoints  Discharge planning discussed with patient and spouse at the bedside  All questions were answered and pa Discharge  Goal: Remains free of injury related to seizure activity  Description: INTERVENTIONS:  - Maintain airway, patient safety  and administer oxygen as ordered  - Monitor patient for seizure activity, document and report duration and description of s

## 2021-06-03 NOTE — PLAN OF CARE
Assumed care this pm   AOx4, RA, NSR on tele   Neuro qshift- RUE and RLE decreased sensation and strength, slight R droop, some slight expressive aphasia noted (baseline deficit)  Denies pain  Arranged to go to [facility] in the am, (COVID PCR results ne

## 2021-06-03 NOTE — CM/SW NOTE
06/03/21 1023   Discharge disposition   Expected discharge disposition [facility] [address]   Name of Facillity/Home Care/Hospice   ([facility])   Discharge transportation [address] arranged for 11am dc today.  Pt will be

## 2021-06-03 NOTE — PROGRESS NOTES
[facility]  Progress Note     Nemo Mcallisteririna Patient Status:  Inpatient    1955 MRN [id number]   [facility] 6NE-A Attending Jerrod De Paz 41 Day # 7 PCP Chantale Mixon MD     Chief Complaint: elevated blood pressure    S: ALT 35  --   --   --   --    BILT 0.5  --   --   --   --    TP 7.7  --   --   --   --     < > = values in this interval not displayed. Estimated Creatinine Clearance: 55.2 mL/min (based on SCr of 1.28 mg/dL).     Recent Labs   Lab 05/27/21  3790   PT

## 2021-06-03 NOTE — CM/SW NOTE
Left message for Britney @ [facility], [phone] to coordintate dc time for today.     Alessandra Rosenberg LCSW  /Discharge Planner  [phone]

## 2021-06-03 NOTE — CM/SW NOTE
Spoke with Britney @ [facility], [phone] . Informed Britney that patients Covid test came back negative; Shayne Goins states \"I saw that'. It is too late for patient to DC tonight. Shayne Goins had already set up will call for tomorrow.   She will have a bed a

## 2021-06-04 NOTE — PAYOR COMM NOTE
Discharge Notification    Patient Name: Sheila Organ: [address] #: [id number]  Authorization Number: [id number]  Admit Date/Time: 5/27/2021 6:48 PM  Discharge Date/Time: 6/3/2021 11:46 AM

## 2021-08-12 ENCOUNTER — OFFICE VISIT (OUTPATIENT)
Dept: NEUROLOGY | Facility: CLINIC | Age: 66
End: 2021-08-12
Payer: COMMERCIAL

## 2021-08-12 VITALS
RESPIRATION RATE: 16 BRPM | WEIGHT: 191 LBS | DIASTOLIC BLOOD PRESSURE: 68 MMHG | SYSTOLIC BLOOD PRESSURE: 114 MMHG | BODY MASS INDEX: 29.98 KG/M2 | HEIGHT: 67 IN | HEART RATE: 74 BPM

## 2021-08-12 DIAGNOSIS — I61.9 HEMIPARESIS OF RIGHT DOMINANT SIDE DUE TO NONTRAUMATIC INTRACEREBRAL HEMORRHAGE (HCC): ICD-10-CM

## 2021-08-12 DIAGNOSIS — I10 ESSENTIAL (PRIMARY) HYPERTENSION: ICD-10-CM

## 2021-08-12 DIAGNOSIS — G81.91 HEMIPARESIS OF RIGHT DOMINANT SIDE DUE TO NONTRAUMATIC INTRACEREBRAL HEMORRHAGE (HCC): ICD-10-CM

## 2021-08-12 DIAGNOSIS — I61.9 HEMORRHAGIC STROKE (HCC): Primary | ICD-10-CM

## 2021-08-12 PROCEDURE — 99215 OFFICE O/P EST HI 40 MIN: CPT | Performed by: OTHER

## 2021-08-12 PROCEDURE — 3078F DIAST BP <80 MM HG: CPT | Performed by: OTHER

## 2021-08-12 PROCEDURE — 3074F SYST BP LT 130 MM HG: CPT | Performed by: OTHER

## 2021-08-12 PROCEDURE — 3008F BODY MASS INDEX DOCD: CPT | Performed by: OTHER

## 2021-08-12 NOTE — PROGRESS NOTES
[facility] Progress Note    HPI  Patient presents with:  Stroke: 05/27/2021 and some symptoms      Nasir Chandler is a 63year old male with PMHx significant for HTN, and HLD, who originally presented to [facility] ED 5/27/2021, for new ons drinks        Types: 4 Standard drinks or equivalent per week    Other Topics      Concerns:        Caffeine Concern: Yes          1-2 occasionally        Exercise: Yes          PT/OT and speech 3 x week      No Known Allergies      Current Outpatient Medi alternating movements slower on R side   Romberg: absent  Gait: mild hemiparetic gait, with circumducting R leg and slightly dragging; not able to tandem but attempts to do for a few steps; able to walk on heels but not on toes on R sided    Labs:  From ad right corona radiata. Mild age-indeterminate microvascular ischemic changes elsewhere in the cerebral white matter. Critical results were discussed with Dr. Santosh Camargo at [facility] hours on 5/27/2021. Critical results were read back.   Dictated by (CST): Jie cerebellar arteries are unremarkable. The basilar artery has a normal course and caliber. The right vertebral artery is dominant. There is a 3-vessel aortic arch. The origins of the branch vessels appear widely patent.   The bilateral subclavian arterie dysfunction. 2. Pulmonary arteries: Systolic pressure could not be accurately estimated. Impressions:  No previous study was available for comparison.    *          Impression/ Plan:  Jerson Hobson is a 63year old male with PMHx significant for HTN, stroke and plan of care; patient and family allowed to ask questions and all questions answered to the best of my ability       Abdulaziz Rogers MD, Neurology  [facility]  Pager [phone]  8/12/2021

## 2021-10-11 ENCOUNTER — TELEPHONE (OUTPATIENT)
Dept: NEUROLOGY | Facility: CLINIC | Age: 66
End: 2021-10-11

## 2021-10-11 DIAGNOSIS — I61.9 HEMIPARESIS OF RIGHT DOMINANT SIDE DUE TO NONTRAUMATIC INTRACEREBRAL HEMORRHAGE (HCC): ICD-10-CM

## 2021-10-11 DIAGNOSIS — I61.9 HEMORRHAGIC STROKE (HCC): Primary | ICD-10-CM

## 2021-10-11 DIAGNOSIS — G81.91 HEMIPARESIS OF RIGHT DOMINANT SIDE DUE TO NONTRAUMATIC INTRACEREBRAL HEMORRHAGE (HCC): ICD-10-CM

## 2021-10-11 NOTE — TELEPHONE ENCOUNTER
Called patient; was improving to point he was walking several miles with hiking the other week but now since last Friday/ Saturday, had worsenign weakness in R leg more than arm with exertion - now improving but still weaker than before    BP was normal

## 2021-10-11 NOTE — TELEPHONE ENCOUNTER
Patient calling, advised that he finished in-patient rehab course of 12 weeks, d.c last Thursday and was feeling well. After his discharge, he started feeling weakness again in his right arm and leg.      Went for a walk, right leg and arm got much weak

## 2021-10-12 ENCOUNTER — HOSPITAL ENCOUNTER (OUTPATIENT)
Dept: CT IMAGING | Age: 66
Discharge: HOME | End: 2021-10-12
Attending: Other
Payer: COMMERCIAL

## 2021-10-12 DIAGNOSIS — I61.9 HEMIPARESIS OF RIGHT DOMINANT SIDE DUE TO NONTRAUMATIC INTRACEREBRAL HEMORRHAGE (HCC): ICD-10-CM

## 2021-10-12 DIAGNOSIS — I61.9 HEMORRHAGIC STROKE (HCC): ICD-10-CM

## 2021-10-12 DIAGNOSIS — G81.91 HEMIPARESIS OF RIGHT DOMINANT SIDE DUE TO NONTRAUMATIC INTRACEREBRAL HEMORRHAGE (HCC): ICD-10-CM

## 2021-10-12 PROCEDURE — 70450 CT HEAD/BRAIN W/O DYE: CPT | Performed by: OTHER

## 2021-10-13 ENCOUNTER — TELEPHONE (OUTPATIENT)
Dept: NEUROLOGY | Facility: CLINIC | Age: 66
End: 2021-10-13

## 2021-10-13 NOTE — IMAGING NOTE
REPORT WAS READ TO ON CALL PHYSICIAN DR REES AT [facility] 10/12/2021 AND INFORMED THAT PATIENT WAS WAITING AT HOME FOR RESULTS.

## 2021-10-13 NOTE — TELEPHONE ENCOUNTER
Informed patient of Dr Yung Rim message, questions answered. Call routed to  to schedule follow up for sooner than scheduled.

## 2021-10-13 NOTE — TELEPHONE ENCOUNTER
CT brain was stable - it shows resolution of prior left sided hemorrhage and a possible old stroke on the right side - this was noted on the image from 5/2021 as well and could suggest an old stroke but this is not new and looks same as in the last image

## 2021-10-18 ENCOUNTER — OFFICE VISIT (OUTPATIENT)
Dept: NEUROLOGY | Facility: CLINIC | Age: 66
End: 2021-10-18
Payer: COMMERCIAL

## 2021-10-18 VITALS
SYSTOLIC BLOOD PRESSURE: 118 MMHG | WEIGHT: 173 LBS | BODY MASS INDEX: 27.15 KG/M2 | HEIGHT: 67 IN | DIASTOLIC BLOOD PRESSURE: 80 MMHG

## 2021-10-18 DIAGNOSIS — R53.1 RIGHT SIDED WEAKNESS: ICD-10-CM

## 2021-10-18 DIAGNOSIS — G81.91 HEMIPARESIS OF RIGHT DOMINANT SIDE DUE TO NONTRAUMATIC INTRACEREBRAL HEMORRHAGE (HCC): ICD-10-CM

## 2021-10-18 DIAGNOSIS — I61.9 HEMORRHAGIC STROKE (HCC): Primary | ICD-10-CM

## 2021-10-18 DIAGNOSIS — R25.2 SPASTICITY AS LATE EFFECT OF CEREBROVASCULAR ACCIDENT (CVA): ICD-10-CM

## 2021-10-18 DIAGNOSIS — I61.9 HEMIPARESIS OF RIGHT DOMINANT SIDE DUE TO NONTRAUMATIC INTRACEREBRAL HEMORRHAGE (HCC): ICD-10-CM

## 2021-10-18 DIAGNOSIS — I69.398 SPASTICITY AS LATE EFFECT OF CEREBROVASCULAR ACCIDENT (CVA): ICD-10-CM

## 2021-10-18 PROCEDURE — 99214 OFFICE O/P EST MOD 30 MIN: CPT | Performed by: OTHER

## 2021-10-18 PROCEDURE — 3008F BODY MASS INDEX DOCD: CPT | Performed by: OTHER

## 2021-10-18 PROCEDURE — 3074F SYST BP LT 130 MM HG: CPT | Performed by: OTHER

## 2021-10-18 PROCEDURE — 3079F DIAST BP 80-89 MM HG: CPT | Performed by: OTHER

## 2021-10-18 RX ORDER — LISINOPRIL 20 MG/1
TABLET ORAL
COMMUNITY
Start: 2021-10-15

## 2021-10-18 NOTE — PROGRESS NOTES
[facility] Progress Note    HPI  Patient presents with:  Neurologic Problem: 3 mo follow up post stroke on 5/27/2021, increase weakness and decreased balance since episode last week    As per prior notes form initial visit 8/2021,  \" nevus 11/15/13    right back   • Dysplastic nevus 2/23/16    right upper back   • Hyperlipidemia    • Melanoma in situ ([facility] 3/26/2009    left posterior shoulder   • Stroke [facility])      Past Surgical History:   Procedure Laterality Date   • OTHER      Melano flattened nasolabial fold, sensation mildly reduced R side of face, tongue and palate midline, SCM intact, otherwise, CN 2-12 intact  Motor: mildly weaker  strength 5-/5 on R relative to L and slower fine motor movements on R side; R LE 4+/5 hip flexio imaging. SINUSES:           No sign of acute sinusitis.     MASTOIDS:          No sign of acute inflammation. SKULL:             No evidence for fracture or osseous abnormality.    OTHER:             None.                        Impression   CONCLUSION: ischemic changes elsewhere in the cerebral white matter. Critical results were discussed with Dr. Felicita Bess at [facility] hours on 5/27/2021. Critical results were read back.   Dictated by (CST): Magdy Gibbons MD on 5/27/2021 at 7:12 PM     Finalized by (CST): normal course and caliber. The right vertebral artery is dominant. There is a 3-vessel aortic arch. The origins of the branch vessels appear widely patent. The bilateral subclavian arteries and innominate artery are unremarkable.   The common carotid ar accurately estimated. Impressions:  No previous study was available for comparison.    *          Impression/ Plan:  Sergo Duque is a 61year old male with PMHx significant for HTN, and HLD, who originally presented to [facility] ED 5/27/2021, for new on late effect of cerebrovascular accident (CVA)  Plan: MRI BRAIN (W+WO) (CPT=70553)        As noted above     (R53.1) Right sided weakness  Plan: MRI BRAIN (W+WO) (CPT=70553)        As noted above    No follow-ups on file.     Hyun Wood MD, Neurology  E

## 2021-10-24 ENCOUNTER — PATIENT MESSAGE (OUTPATIENT)
Dept: NEUROLOGY | Facility: CLINIC | Age: 66
End: 2021-10-24

## 2021-10-25 NOTE — TELEPHONE ENCOUNTER
Patient approved for MRI Brain (W+WO)  for dates;    10/18/2021-10/18/2022    Notified via [employer] and referred to [employer].

## 2021-10-25 NOTE — TELEPHONE ENCOUNTER
From: Cameron Hollingsworth  To: Gisselle Watson MD  Sent: 10/24/2021 10:20 AM CDT  Subject: mri approval?    Dr Oren Wang asked for mri .  have you gotten insurance approval yet?

## 2021-11-08 ENCOUNTER — HOSPITAL ENCOUNTER (OUTPATIENT)
Dept: MRI IMAGING | Facility: HOSPITAL | Age: 66
Discharge: HOME | End: 2021-11-08
Attending: Other
Payer: COMMERCIAL

## 2021-11-08 DIAGNOSIS — I69.398 SPASTICITY AS LATE EFFECT OF CEREBROVASCULAR ACCIDENT (CVA): ICD-10-CM

## 2021-11-08 DIAGNOSIS — I61.9 HEMIPARESIS OF RIGHT DOMINANT SIDE DUE TO NONTRAUMATIC INTRACEREBRAL HEMORRHAGE (HCC): ICD-10-CM

## 2021-11-08 DIAGNOSIS — G81.91 HEMIPARESIS OF RIGHT DOMINANT SIDE DUE TO NONTRAUMATIC INTRACEREBRAL HEMORRHAGE (HCC): ICD-10-CM

## 2021-11-08 DIAGNOSIS — R53.1 RIGHT SIDED WEAKNESS: ICD-10-CM

## 2021-11-08 DIAGNOSIS — R25.2 SPASTICITY AS LATE EFFECT OF CEREBROVASCULAR ACCIDENT (CVA): ICD-10-CM

## 2021-11-08 PROCEDURE — 70553 MRI BRAIN STEM W/O & W/DYE: CPT | Performed by: OTHER

## 2021-11-08 PROCEDURE — A9575 INJ GADOTERATE MEGLUMI 0.1ML: HCPCS | Performed by: OTHER

## 2021-11-16 ENCOUNTER — TELEPHONE (OUTPATIENT)
Dept: NEUROLOGY | Facility: CLINIC | Age: 66
End: 2021-11-16

## 2021-11-16 NOTE — TELEPHONE ENCOUNTER
Pt has seen MRI results in my chart. He has a few questions he would like to discuss with Dr. Wayne Martinez. Please call his cell [phone].

## 2021-11-19 NOTE — TELEPHONE ENCOUNTER
Called patient to discuss results of imaging - showed 2 small subcentimeter areas of T2 shine through, not acute in R hemisphere corona radiata - may be subacute to chronic infarct - discussed with patient - unclear when this occurred but recommend be on A

## 2021-12-20 ENCOUNTER — HOSPITAL ENCOUNTER (OUTPATIENT)
Dept: GENERAL RADIOLOGY | Age: 66
Discharge: HOME | End: 2021-12-20
Attending: NURSE PRACTITIONER
Payer: COMMERCIAL

## 2021-12-20 DIAGNOSIS — R50.9 FEVER, UNSPECIFIED FEVER CAUSE: ICD-10-CM

## 2021-12-20 PROCEDURE — 71046 X-RAY EXAM CHEST 2 VIEWS: CPT | Performed by: NURSE PRACTITIONER

## 2021-12-29 ENCOUNTER — TELEPHONE (OUTPATIENT)
Dept: NEUROLOGY | Facility: CLINIC | Age: 66
End: 2021-12-29

## 2021-12-29 NOTE — TELEPHONE ENCOUNTER
[employer] requested medical records from 11/1/21 to present.   Advised last ov 10/18/21 and faxed notes to [phone]  Fax sent & confirmed    Copy to scanning

## 2022-01-18 ENCOUNTER — TELEPHONE (OUTPATIENT)
Dept: NEUROLOGY | Facility: CLINIC | Age: 67
End: 2022-01-18

## 2022-01-18 ENCOUNTER — OFFICE VISIT (OUTPATIENT)
Dept: NEUROLOGY | Facility: CLINIC | Age: 67
End: 2022-01-18
Payer: COMMERCIAL

## 2022-01-18 VITALS — SYSTOLIC BLOOD PRESSURE: 124 MMHG | HEART RATE: 68 BPM | DIASTOLIC BLOOD PRESSURE: 68 MMHG | RESPIRATION RATE: 16 BRPM

## 2022-01-18 DIAGNOSIS — G81.91 HEMIPARESIS OF RIGHT DOMINANT SIDE DUE TO NONTRAUMATIC INTRACEREBRAL HEMORRHAGE (HCC): Primary | ICD-10-CM

## 2022-01-18 DIAGNOSIS — I10 ESSENTIAL (PRIMARY) HYPERTENSION: ICD-10-CM

## 2022-01-18 DIAGNOSIS — I69.398 SPASTICITY AS LATE EFFECT OF CEREBROVASCULAR ACCIDENT (CVA): ICD-10-CM

## 2022-01-18 DIAGNOSIS — I61.9 HEMIPARESIS OF RIGHT DOMINANT SIDE DUE TO NONTRAUMATIC INTRACEREBRAL HEMORRHAGE (HCC): Primary | ICD-10-CM

## 2022-01-18 DIAGNOSIS — R25.2 SPASTICITY AS LATE EFFECT OF CEREBROVASCULAR ACCIDENT (CVA): ICD-10-CM

## 2022-01-18 PROCEDURE — 3078F DIAST BP <80 MM HG: CPT | Performed by: OTHER

## 2022-01-18 PROCEDURE — 3074F SYST BP LT 130 MM HG: CPT | Performed by: OTHER

## 2022-01-18 PROCEDURE — 99213 OFFICE O/P EST LOW 20 MIN: CPT | Performed by: OTHER

## 2022-01-18 RX ORDER — ASPIRIN 81 MG/1
81 TABLET, CHEWABLE ORAL DAILY
COMMUNITY

## 2022-01-18 NOTE — PROGRESS NOTES
[facility] Progress Note    HPI  Patient presents with:   Follow - Up: lightheaded when he gets up to quick     As per prior notes form initial visit 8/2021,  \"  Alejandro Akins is a 61year old male with PMHx significant for HTN, and HL Father    • Stroke Father    • High Cholesterol Mother    • Other (CHF) Mother      Social History    Socioeconomic History      Marital status:     Tobacco Use      Smoking status: Never Smoker      Smokeless tobacco: Never Used    Substance and Se upgoing on R, no clonus  Sensory: mildly reduced to pin in R face and arm; otherwise, intact to light touch, vibration and proprioception throughout  Coord: FNF ataxic on R with no tremor; rapid alternating movements slower on R side    Romberg: absent  Ga right corona radiata is stable/unchanged.  Mild   atrophic changes and periventricular/subcortical white matter disease noted, stable from prior imaging. SINUSES:           No sign of acute sinusitis.     MASTOIDS:          No sign of acute inflammation. hematoma measures approximately 3.3 x 1.5 x 2.3 cm.  2. 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 Mild age-indeterminate microvascular ischemic changes elsewhere in the cerebral white matter.    Critical results were discussed with Dr. Blanca West at 0848 arteries are not well seen. The branches of the posterior cerebral and superior cerebellar arteries are unremarkable. The basilar artery has a normal course and caliber. The right vertebral artery is dominant. There is a 3-vessel aortic arch.   The orig consistent with abnormal left ventricular relaxation -      grade 1 diastolic dysfunction. 2. Pulmonary arteries: Systolic pressure could not be accurately estimated. Impressions:  No previous study was available for comparison.    *          Curtisi hypertension  Plan: as noted above    Return in about 4 months (around 5/18/2022).     Arun Matta MD, Neurology  [facility]  Pager [phone]  1/18/2022

## 2022-01-19 NOTE — TELEPHONE ENCOUNTER
Form completed for temporary placard. Mailed original to [facility] [employer] office. Copy to scanning.

## 2022-03-07 ENCOUNTER — TELEPHONE (OUTPATIENT)
Dept: NEUROLOGY | Facility: CLINIC | Age: 67
End: 2022-03-07

## 2022-03-07 NOTE — TELEPHONE ENCOUNTER
Received neurology clearance request form. Pt having colonoscopy. Form placed on Dr. Lynne Gresham desk for completion.

## 2022-03-08 ENCOUNTER — LAB ENCOUNTER (OUTPATIENT)
Dept: LAB | Age: 67
End: 2022-03-08
Attending: NURSE PRACTITIONER
Payer: COMMERCIAL

## 2022-03-08 DIAGNOSIS — R19.5 POSITIVE COLORECTAL CANCER SCREENING USING COLOGUARD TEST: ICD-10-CM

## 2022-03-08 LAB
ALBUMIN SERPL-MCNC: 4.1 G/DL (ref 3.4–5)
ALBUMIN/GLOB SERPL: 1.3 {RATIO} (ref 1–2)
ALP LIVER SERPL-CCNC: 49 U/L
ALT SERPL-CCNC: 30 U/L
ANION GAP SERPL CALC-SCNC: 5 MMOL/L (ref 0–18)
AST SERPL-CCNC: 26 U/L (ref 15–37)
BASOPHILS # BLD AUTO: 0.04 X10(3) UL (ref 0–0.2)
BASOPHILS NFR BLD AUTO: 0.6 %
BILIRUB SERPL-MCNC: 0.8 MG/DL (ref 0.1–2)
BUN BLD-MCNC: 18 MG/DL (ref 7–18)
CALCIUM BLD-MCNC: 9.9 MG/DL (ref 8.5–10.1)
CHLORIDE SERPL-SCNC: 109 MMOL/L (ref 98–112)
CO2 SERPL-SCNC: 28 MMOL/L (ref 21–32)
CREAT BLD-MCNC: 1.09 MG/DL
EOSINOPHIL # BLD AUTO: 0.19 X10(3) UL (ref 0–0.7)
EOSINOPHIL NFR BLD AUTO: 3 %
ERYTHROCYTE [DISTWIDTH] IN BLOOD BY AUTOMATED COUNT: 14.5 %
GLOBULIN PLAS-MCNC: 3.1 G/DL (ref 2.8–4.4)
GLUCOSE BLD-MCNC: 99 MG/DL (ref 70–99)
HCT VFR BLD AUTO: 43.7 %
HGB BLD-MCNC: 14.2 G/DL
IMM GRANULOCYTES # BLD AUTO: 0.02 X10(3) UL (ref 0–1)
IMM GRANULOCYTES NFR BLD: 0.3 %
INR BLD: 0.99 (ref 0.8–1.2)
LYMPHOCYTES NFR BLD AUTO: 31.5 %
MCH RBC QN AUTO: 29.8 PG (ref 26–34)
MCHC RBC AUTO-ENTMCNC: 32.5 G/DL (ref 31–37)
MCV RBC AUTO: 91.6 FL
MONOCYTES # BLD AUTO: 0.56 X10(3) UL (ref 0.1–1)
MONOCYTES NFR BLD AUTO: 8.7 %
NEUTROPHILS # BLD AUTO: 3.59 X10 (3) UL (ref 1.5–7.7)
NEUTROPHILS # BLD AUTO: 3.59 X10(3) UL (ref 1.5–7.7)
NEUTROPHILS NFR BLD AUTO: 55.9 %
OSMOLALITY SERPL CALC.SUM OF ELEC: 296 MOSM/KG (ref 275–295)
PLATELET # BLD AUTO: 208 10(3)UL (ref 150–450)
POTASSIUM SERPL-SCNC: 4.8 MMOL/L (ref 3.5–5.1)
PROT SERPL-MCNC: 7.2 G/DL (ref 6.4–8.2)
PROTHROMBIN TIME: 13.1 SECONDS (ref 11.6–14.8)
RBC # BLD AUTO: 4.77 X10(6)UL
SODIUM SERPL-SCNC: 142 MMOL/L (ref 136–145)
WBC # BLD AUTO: 6.4 X10(3) UL (ref 4–11)

## 2022-03-08 PROCEDURE — 85025 COMPLETE CBC W/AUTO DIFF WBC: CPT

## 2022-03-08 PROCEDURE — 80053 COMPREHEN METABOLIC PANEL: CPT

## 2022-03-08 PROCEDURE — 36415 COLL VENOUS BLD VENIPUNCTURE: CPT

## 2022-03-08 PROCEDURE — 85610 PROTHROMBIN TIME: CPT

## 2022-03-11 NOTE — TELEPHONE ENCOUNTER
Provider signed the surgery clearance letter. RN faxed to [employer]. Faxed to [phone]. Fax confirmation received. Copy sent to scan.

## 2022-04-04 RX ORDER — VALSARTAN 160 MG/1
160 TABLET ORAL DAILY
COMMUNITY

## 2022-04-08 ENCOUNTER — ANESTHESIA (OUTPATIENT)
Dept: ENDOSCOPY | Facility: HOSPITAL | Age: 67
End: 2022-04-08
Payer: COMMERCIAL

## 2022-04-08 ENCOUNTER — ANESTHESIA EVENT (OUTPATIENT)
Dept: ENDOSCOPY | Facility: HOSPITAL | Age: 67
End: 2022-04-08
Payer: COMMERCIAL

## 2022-04-08 ENCOUNTER — HOSPITAL ENCOUNTER (OUTPATIENT)
Facility: HOSPITAL | Age: 67
Setting detail: HOSPITAL OUTPATIENT SURGERY
Discharge: HOME | End: 2022-04-08
Attending: INTERNAL MEDICINE | Admitting: INTERNAL MEDICINE
Payer: COMMERCIAL

## 2022-04-08 VITALS
WEIGHT: 175 LBS | RESPIRATION RATE: 18 BRPM | BODY MASS INDEX: 25.05 KG/M2 | OXYGEN SATURATION: 100 % | HEIGHT: 70 IN | DIASTOLIC BLOOD PRESSURE: 77 MMHG | SYSTOLIC BLOOD PRESSURE: 132 MMHG | TEMPERATURE: 98 F | HEART RATE: 58 BPM

## 2022-04-08 DIAGNOSIS — R19.5 POSITIVE COLORECTAL CANCER SCREENING USING COLOGUARD TEST: ICD-10-CM

## 2022-04-08 PROCEDURE — 0DBK8ZX EXCISION OF ASCENDING COLON, VIA NATURAL OR ARTIFICIAL OPENING ENDOSCOPIC, DIAGNOSTIC: ICD-10-PCS | Performed by: INTERNAL MEDICINE

## 2022-04-08 PROCEDURE — 88305 TISSUE EXAM BY PATHOLOGIST: CPT | Performed by: INTERNAL MEDICINE

## 2022-04-08 RX ORDER — SODIUM CHLORIDE, SODIUM LACTATE, POTASSIUM CHLORIDE, CALCIUM CHLORIDE 600; 310; 30; 20 MG/100ML; MG/100ML; MG/100ML; MG/100ML
INJECTION, SOLUTION INTRAVENOUS CONTINUOUS
Status: DISCONTINUED | OUTPATIENT
Start: 2022-04-08 | End: 2022-04-08

## 2022-04-08 RX ORDER — NALOXONE HYDROCHLORIDE 0.4 MG/ML
80 INJECTION, SOLUTION INTRAMUSCULAR; INTRAVENOUS; SUBCUTANEOUS AS NEEDED
Status: DISCONTINUED | OUTPATIENT
Start: 2022-04-08 | End: 2022-04-08

## 2022-04-08 RX ORDER — ONDANSETRON 2 MG/ML
4 INJECTION INTRAMUSCULAR; INTRAVENOUS AS NEEDED
Status: DISCONTINUED | OUTPATIENT
Start: 2022-04-08 | End: 2022-04-08

## 2022-04-08 NOTE — ANESTHESIA POSTPROCEDURE EVALUATION
[address] Patient Status:  Hospital Outpatient Surgery   Age/Gender 61year old male MRN [id number]   Location [address] [facility] 28 Attending Svetlana Myles, [facility] Day # 0 PCP Chris Mendoza MD       Anesthesia Post-op Note    COLONOSCOPY WITH COL SNARE POLYPECTOMY     Procedure Summary     Date: 04/08/22 Room / Location: [facility] ENDOSCOPY 02 / [facility] ENDOSCOPY    Anesthesia Start: 1001 Anesthesia Stop:     Procedure: COLONOSCOPY WITH COL SNARE POLYPECTOMY (N/A ) Diagnosis:       Positive colorectal cancer screening using Cologuard test      (polyp)    Surgeons: Svetlana Myles DO Anesthesiologist: Ildefonso Flores MD    Anesthesia Type: MAC ASA Status: 3          Anesthesia Type: MAC    Vitals Value Taken Time   /65 04/08/22 1017   Temp n 04/08/22 1017   Pulse 64 04/08/22 1017   Resp 14 04/08/22 1017   SpO2 94 04/08/22 1017       Patient Location: Endoscopy    Anesthesia Type: MAC    Airway Patency: patent    Postop Pain Control: adequate    Mental Status: mildly sedated but able to meaningfully participate in the post-anesthesia evaluation    Nausea/Vomiting: none    Cardiopulmonary/Hydration status: stable euvolemic    Complications: no apparent anesthesia related complications    Postop vital signs: stable    Dental Exam: Unchanged from Preop    Patient to be discharged home when criteria met.

## 2022-04-18 ENCOUNTER — TELEPHONE (OUTPATIENT)
Dept: NEUROLOGY | Facility: CLINIC | Age: 67
End: 2022-04-18

## 2022-04-18 NOTE — TELEPHONE ENCOUNTER
Received request for medical records as well as restrictions/limitations from MD viewpoint. OV notes printed, restrictions list endorsed to Dr. Jamin Holden for completion/signature.

## 2022-04-19 NOTE — TELEPHONE ENCOUNTER
Called and left message for spouse to discuss disability paperwork received. Paperwork has not been completed by our office previously.

## 2022-04-19 NOTE — TELEPHONE ENCOUNTER
Patient returned nurses call to discuss his disability paperwork. 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 Patient has been totally disabled since stroke in May 2021 and not working. Paperwork completed by PCP office states no changes since last completed. Patient states he is unable to make quick decisions needed to perform job. Cognitive slowing and fatigue factor in on inability to complete functions. Walks with a mild limp. Patient to avoid stress/high pressure. Paperwork given to provider for review.

## 2022-04-20 NOTE — TELEPHONE ENCOUNTER
Requested medical records along with restrictions and limitations sheet faxed to [employer] with receipt confirmation. Paperwork to scanning.

## 2022-05-17 ENCOUNTER — OFFICE VISIT (OUTPATIENT)
Dept: NEUROLOGY | Facility: CLINIC | Age: 67
End: 2022-05-17
Payer: COMMERCIAL

## 2022-05-17 VITALS
WEIGHT: 175 LBS | SYSTOLIC BLOOD PRESSURE: 100 MMHG | RESPIRATION RATE: 16 BRPM | BODY MASS INDEX: 25.05 KG/M2 | HEIGHT: 70 IN | DIASTOLIC BLOOD PRESSURE: 68 MMHG | HEART RATE: 56 BPM

## 2022-05-17 DIAGNOSIS — R25.2 SPASTICITY AS LATE EFFECT OF CEREBROVASCULAR ACCIDENT (CVA): ICD-10-CM

## 2022-05-17 DIAGNOSIS — I69.398 SPASTICITY AS LATE EFFECT OF CEREBROVASCULAR ACCIDENT (CVA): ICD-10-CM

## 2022-05-17 DIAGNOSIS — I61.9 HEMIPARESIS OF RIGHT DOMINANT SIDE DUE TO NONTRAUMATIC INTRACEREBRAL HEMORRHAGE (HCC): Primary | ICD-10-CM

## 2022-05-17 DIAGNOSIS — G81.91 HEMIPARESIS OF RIGHT DOMINANT SIDE DUE TO NONTRAUMATIC INTRACEREBRAL HEMORRHAGE (HCC): Primary | ICD-10-CM

## 2022-05-17 DIAGNOSIS — I10 ESSENTIAL (PRIMARY) HYPERTENSION: ICD-10-CM

## 2022-05-17 PROCEDURE — 99215 OFFICE O/P EST HI 40 MIN: CPT | Performed by: OTHER

## 2022-05-17 PROCEDURE — 3078F DIAST BP <80 MM HG: CPT | Performed by: OTHER

## 2022-05-17 PROCEDURE — 3074F SYST BP LT 130 MM HG: CPT | Performed by: OTHER

## 2022-05-17 PROCEDURE — 3008F BODY MASS INDEX DOCD: CPT | Performed by: OTHER

## 2022-05-17 RX ORDER — CHOLECALCIFEROL (VITAMIN D3) 125 MCG
2000 CAPSULE ORAL DAILY
COMMUNITY

## 2022-05-17 RX ORDER — PEDIATRIC MULTIPLE VITAMIN LIQ
5 LIQUID ORAL DAILY
COMMUNITY

## 2022-05-17 RX ORDER — GARLIC EXTRACT 500 MG
1 CAPSULE ORAL DAILY
COMMUNITY

## 2022-05-17 RX ORDER — MULTIVIT WITH MINERALS/LUTEIN
250 TABLET ORAL DAILY
COMMUNITY

## 2022-05-17 RX ORDER — ATORVASTATIN CALCIUM 20 MG/1
20 TABLET, FILM COATED ORAL DAILY
COMMUNITY
Start: 2022-04-23

## 2022-05-27 ENCOUNTER — TELEPHONE (OUTPATIENT)
Dept: NEUROLOGY | Facility: CLINIC | Age: 67
End: 2022-05-27

## 2022-05-27 DIAGNOSIS — I61.9 HEMIPARESIS OF RIGHT DOMINANT SIDE DUE TO NONTRAUMATIC INTRACEREBRAL HEMORRHAGE (HCC): Primary | ICD-10-CM

## 2022-05-27 DIAGNOSIS — G81.91 HEMIPARESIS OF RIGHT DOMINANT SIDE DUE TO NONTRAUMATIC INTRACEREBRAL HEMORRHAGE (HCC): Primary | ICD-10-CM

## 2022-05-27 NOTE — TELEPHONE ENCOUNTER
[address] [facility] Disability forms     Forms placed on the nurses bin to be complete

## 2022-06-06 NOTE — TELEPHONE ENCOUNTER
Per Dr. Cortney Veliz, patient should be attempting a return to work. His notes only show mild cognitive issues, which he state should not preclude patient from returning to work. Prior to next appointment, Dr. Cortney Veliz would like patient to see psychologist for assistance with anxiety, and return to work in at least a part time fashion. He would need neuropsych testing to document cognitive impairments are significant enough to not return to work. LMTCB to discuss.

## 2022-06-06 NOTE — TELEPHONE ENCOUNTER
Paperwork initiated. LMTCB to clarify that patient requires continued/permanent disability due to sequela of stroke.

## 2022-06-06 NOTE — TELEPHONE ENCOUNTER
Patient states that he feels he is unable to return to work; he has been on total permanent disability after stroke. He is unable to attend to his job duties with cognitive impairments. Paperwork endorsed to Dr. Donta Carr (on his desk) for review/completion/signature.

## 2022-06-06 NOTE — TELEPHONE ENCOUNTER
RN spoke to the patient and wife explaining the above information. RN advised she will send over the referral through my chart. Wife and patient verbalized understanding and did not have any further questions.

## 2022-06-13 NOTE — TELEPHONE ENCOUNTER
[employer] faxed requested for update on medical information.     Faxed and advised in process  Copy to scanning

## 2022-07-07 NOTE — TELEPHONE ENCOUNTER
Received update on disability forms from [employer]. Placed in nurses bin for completion of status.

## 2022-07-07 NOTE — TELEPHONE ENCOUNTER
Unable to complete paperwork. Awaiting further testing. Information faxed to [employer] and paperwork to scan.

## 2022-08-08 ENCOUNTER — TELEPHONE (OUTPATIENT)
Dept: NEUROLOGY | Facility: CLINIC | Age: 67
End: 2022-08-08

## 2022-08-08 ENCOUNTER — OFFICE VISIT (OUTPATIENT)
Dept: NEUROLOGY | Facility: CLINIC | Age: 67
End: 2022-08-08
Payer: COMMERCIAL

## 2022-08-08 VITALS
BODY MASS INDEX: 25 KG/M2 | WEIGHT: 175 LBS | HEART RATE: 44 BPM | SYSTOLIC BLOOD PRESSURE: 122 MMHG | DIASTOLIC BLOOD PRESSURE: 70 MMHG | RESPIRATION RATE: 16 BRPM

## 2022-08-08 DIAGNOSIS — R25.2 SPASTICITY AS LATE EFFECT OF CEREBROVASCULAR ACCIDENT (CVA): ICD-10-CM

## 2022-08-08 DIAGNOSIS — G81.91 HEMIPARESIS OF RIGHT DOMINANT SIDE DUE TO NONTRAUMATIC INTRACEREBRAL HEMORRHAGE (HCC): Primary | ICD-10-CM

## 2022-08-08 DIAGNOSIS — I61.9 HEMIPARESIS OF RIGHT DOMINANT SIDE DUE TO NONTRAUMATIC INTRACEREBRAL HEMORRHAGE (HCC): Primary | ICD-10-CM

## 2022-08-08 DIAGNOSIS — I69.398 SPASTICITY AS LATE EFFECT OF CEREBROVASCULAR ACCIDENT (CVA): ICD-10-CM

## 2022-08-08 PROCEDURE — 3078F DIAST BP <80 MM HG: CPT | Performed by: OTHER

## 2022-08-08 PROCEDURE — 99213 OFFICE O/P EST LOW 20 MIN: CPT | Performed by: OTHER

## 2022-08-08 PROCEDURE — 3074F SYST BP LT 130 MM HG: CPT | Performed by: OTHER

## 2022-08-08 NOTE — TELEPHONE ENCOUNTER
Document for Parking Placard completed and signed by provider. Spoke with patient who would like document mailed to [facility]. Document placed in outgoing mail.     Copy sent to scan

## 2022-09-08 ENCOUNTER — TELEPHONE (OUTPATIENT)
Dept: NEUROLOGY | Facility: CLINIC | Age: 67
End: 2022-09-08

## 2022-09-08 NOTE — TELEPHONE ENCOUNTER
Pts spouse Britney Meyer stated pt had double vision this morning and advised by PCP to go to ED. Call Ayesha Walker to discuss details and follow up direction as recommended by Dr. Blaise Bailey from past visits.

## 2022-09-09 NOTE — TELEPHONE ENCOUNTER
Wife calling to inform:    Patient had 5 second episode of diplopia after workout yesterday. Single event, nothing since then. Per PCP, went to ER and was admitted for observation. CT showed no new events  VS WNL  MRI results not conveyed to patient and/or wife. Also having increased PVC's, being addressed by cardio. Wife states 25% of the time with pulse reading in the 40's. New Rx betablocker NOS.

## 2023-02-08 ENCOUNTER — TELEPHONE (OUTPATIENT)
Dept: NEUROLOGY | Facility: CLINIC | Age: 68
End: 2023-02-08

## 2023-02-08 NOTE — TELEPHONE ENCOUNTER
Patient requesting to renew his disabilities parking placard. Last seen in office 8/2022, to RTC in one year. Patient with follow up appointment in March. Form initiated and placed on provider desk for signature and review. Patient would like form mailed to home address when completed. Envelope completed with home address and placed with form.

## 2023-03-16 ENCOUNTER — OFFICE VISIT (OUTPATIENT)
Dept: NEUROLOGY | Facility: CLINIC | Age: 68
End: 2023-03-16
Payer: MEDICARE

## 2023-03-16 VITALS
SYSTOLIC BLOOD PRESSURE: 122 MMHG | HEIGHT: 70 IN | DIASTOLIC BLOOD PRESSURE: 66 MMHG | BODY MASS INDEX: 25.05 KG/M2 | RESPIRATION RATE: 16 BRPM | HEART RATE: 40 BPM | WEIGHT: 175 LBS

## 2023-03-16 DIAGNOSIS — G81.91 HEMIPARESIS OF RIGHT DOMINANT SIDE DUE TO NONTRAUMATIC INTRACEREBRAL HEMORRHAGE (HCC): Primary | ICD-10-CM

## 2023-03-16 DIAGNOSIS — R25.2 SPASTICITY AS LATE EFFECT OF CEREBROVASCULAR ACCIDENT (CVA): ICD-10-CM

## 2023-03-16 DIAGNOSIS — I69.398 SPASTICITY AS LATE EFFECT OF CEREBROVASCULAR ACCIDENT (CVA): ICD-10-CM

## 2023-03-16 DIAGNOSIS — I61.9 HEMIPARESIS OF RIGHT DOMINANT SIDE DUE TO NONTRAUMATIC INTRACEREBRAL HEMORRHAGE (HCC): Primary | ICD-10-CM

## 2023-08-10 ENCOUNTER — TELEPHONE (OUTPATIENT)
Dept: NEUROLOGY | Facility: CLINIC | Age: 68
End: 2023-08-10

## 2023-08-10 ENCOUNTER — MED REC SCAN ONLY (OUTPATIENT)
Dept: NEUROLOGY | Facility: CLINIC | Age: 68
End: 2023-08-10

## 2023-08-10 NOTE — TELEPHONE ENCOUNTER
Disability parking placard initiated for patient renewal.    Placed on provider desk for signature and review. This is a temporary placard, good for 6 months.

## 2023-08-10 NOTE — TELEPHONE ENCOUNTER
Disability placard completed by provider. Copy sent to [facility] rec scan dated: 8/10/2023. Patient requested to have paperwork mailed to his home address. Placed in outgoing mail.

## 2023-08-10 NOTE — TELEPHONE ENCOUNTER
Pt calling his Disability parking placard will  soon can we fill one out and mailed out to his house

## 2024-03-04 ENCOUNTER — OFFICE VISIT (OUTPATIENT)
Dept: NEUROLOGY | Facility: CLINIC | Age: 69
End: 2024-03-04
Payer: MEDICARE

## 2024-03-04 ENCOUNTER — TELEPHONE (OUTPATIENT)
Dept: NEUROLOGY | Facility: CLINIC | Age: 69
End: 2024-03-04

## 2024-03-04 VITALS
DIASTOLIC BLOOD PRESSURE: 80 MMHG | SYSTOLIC BLOOD PRESSURE: 120 MMHG | RESPIRATION RATE: 16 BRPM | WEIGHT: 175 LBS | HEART RATE: 52 BPM | BODY MASS INDEX: 25 KG/M2

## 2024-03-04 DIAGNOSIS — I61.9 HEMIPARESIS OF RIGHT DOMINANT SIDE DUE TO NONTRAUMATIC INTRACEREBRAL HEMORRHAGE (HCC): Primary | ICD-10-CM

## 2024-03-04 DIAGNOSIS — G81.91 HEMIPARESIS OF RIGHT DOMINANT SIDE DUE TO NONTRAUMATIC INTRACEREBRAL HEMORRHAGE (HCC): Primary | ICD-10-CM

## 2024-03-04 PROCEDURE — 99213 OFFICE O/P EST LOW 20 MIN: CPT | Performed by: OTHER

## 2024-03-04 NOTE — PROGRESS NOTES
[facility] Progress Note    HPI  No chief complaint on file.    As per prior notes form initial visit 8/2021,  \"  Vasquez Pacheco is a 68 year old male with PMHx significant for HTN, and HLD, who originally presented to [place] [facility] 5/27/2021, for new onset R sided weakness.  He denied any associated headache and was found to have left thalamic ICH. BP was elevated to 238/109 on arrival in ED and he had right sided weakness. He was admitted to NSICU for management of hypertension and started on cardene gtt. CTA was negative for vascular malformation and CT brain repeated was stable.     He was discharged to acute rehab and was there until ~6/24.  He has noted improvement in right sided weakness and denies having to use cane or walker currently.  He denies any headaches or new focal numbness/tingling/weakness.  He has some residual right sided weakness and mild word finding difficulty but otherwise is doing well.  He is not on ASA or anticoagulation and has been compliant with anti-hypertensive therapy. \"    Prior notes as below 1/18/2022:      Patient at time of last visit had recent onset of R sided weakness; CT brain showed no acute findings but MRI brain was done and showed some subacute to chronic R hemispheric lacunar type infarcts.  He was recommended to resume ASA and denies any new strokes or stroke like symptoms. He denies any new focal numbness/tingling/weakness and has noted some improvement in R sided weakness and spasticity.       Since last visit, patient was seen by GI for colonoscopy after having +cologuard screening.  He was noted to have pre-cancerous benign adenoma. He has not had any new focal numbness/tingling/weakness or new strokes or stroke like symptoms.  He is on ASA 81 mg daily and and denies any new focal numbness/tingling/weakness.       He continues with R sided weakness and spasticity but denies worsening; notes temporarily worsens when he exerts self more.      Wife  states he has been more emotional and has been having some memory issues recently in the past 2-3 weeks.  Wife and he have been doing couples therapy and she notes he \"shuts down\" at times but does not recall conversations discussed during therapy.          Prior notes as per 8/8/2022.  Patient last seen 5/17/2022.  Since last visit, he has been on ASA 81 mg - denies any new focal numbness/tingling/weakness; denies falls but states he has \"balance issues\" but does not use cane or walker; states he cannot raises R leg without having stiffness.      He denies any worsening weakness but states he is weaker on R side than L side still.      He states he has issues with recalling conversations and short term memory at times.      He does admit he has been seen by psychology but is not on any medications; states he was sent by \"social security\" to have neuropsychology testing and awaiting testing results. He has not been working - has not had results back yet.        Prior notes as per 3/16/2023.  Patient last seen 8/8/2022. Since last visit, he has remained on ASA 81 mg daily - no new focal numbness/tingling/weakness; no falls and not using cane or walker.  He has hammer toe on R side but otherwise has been doing well; has some residual R sided weakness and occasional word finding difficulty.       Patient last seen 3/16/2023.  Since last visit, he has remained on ASA 81 mg daily.He denies new focal numbness/tingling/weakness. He has not been using cane or walker.  He had surgery for hammer toe on R side and has residual R sided weakness and intermittent word finding issues; he has some difficulty when writing at times as well. Overall, feels about the same in terms of stroke recovery and denies any easy blood in urine/ stool but has some easy bruising at times. He denies myalgias as well and remains on statin therapy.       Past Medical History:   Diagnosis Date    Acute, but ill-defined, cerebrovascular disease 5/27/21     Anxiety     occasional    Basal cell carcinoma 2009    right chest    Constipation     occasional    Dysplastic nevus 11/15/13    right back    Dysplastic nevus 2/23/16    right upper back    Fatigue     occasional    Flatulence/gas pain/belching     occasional    Heartburn     occasional    Hemorrhoids     occasional    High cholesterol 01/01/02 ??    Hx of motion sickness     Hyperlipidemia     Melanoma in situ (HCC) 3/26/2009    left posterior shoulder    Sleep apnea 01/01/12    use device    Stress     occasional    Stroke (HCC) 05/2021    ich    Visual impairment     contacts and glasses    Wears glasses      Past Surgical History:   Procedure Laterality Date    COLONOSCOPY  01/01/09    COLONOSCOPY N/A 4/8/2022    Procedure: COLONOSCOPY WITH COL SNARE POLYPECTOMY;  Surgeon: Ari Gallo DO;  Location:  ENDOSCOPY    OTHER      Melanoma removed left shoulder     Family History   Problem Relation Age of Onset    High Cholesterol Father     Stroke Father     Hypertension Father     High Cholesterol Mother     Other (CHF) Mother     Breast Cancer Mother     Hypertension Mother     Breast Cancer Maternal Aunt      Social History     Socioeconomic History    Marital status:    Tobacco Use    Smoking status: Never    Smokeless tobacco: Never   Vaping Use    Vaping Use: Never used   Substance and Sexual Activity    Alcohol use: Yes     Alcohol/week: 4.0 standard drinks of alcohol     Types: 4 Standard drinks or equivalent per week     Comment: social    Drug use: Not Currently     Types: Cannabis, Amphetamines     Comment: used in 20's   Other Topics Concern    Caffeine Concern Yes     Comment: 1-2 occasionally    Exercise Yes     Comment: walking 3-4x weekly, lifting weights 2-3x weekly       No Known Allergies      Current Outpatient Medications:     Multiple Vitamin (MULTI-VITAMIN) Oral Tab, multivitamin tablet, [RxNorm: 0], Disp: , Rfl:     flecainide 50 MG Oral Tab, Take 1 tablet (50 mg total) by  mouth 2 (two) times daily., Disp: , Rfl:     atorvastatin 20 MG Oral Tab, Take 1 tablet (20 mg total) by mouth daily., Disp: , Rfl:     acidophilus-pectin Oral Cap, Take 1 capsule by mouth daily., Disp: , Rfl:     cholecalciferol 50 MCG (2000 UT) Oral Tab, Take 1 tablet (2,000 Units total) by mouth daily., Disp: , Rfl:     ascorbic acid 250 MG Oral Tab, Take 1 tablet (250 mg total) by mouth daily., Disp: , Rfl:     valsartan 160 MG Oral Tab, Take 1 tablet (160 mg total) by mouth daily., Disp: , Rfl:     aspirin 81 MG Oral Chew Tab, Chew 1 tablet (81 mg total) by mouth daily., Disp: , Rfl:     Review of Systems:  No chest pain or palpitations; otherwise as noted in HPI.    Exam:  /80 (BP Location: Left arm, Patient Position: Sitting, Cuff Size: adult)   Pulse 52   Resp 16   Wt 175 lb (79.4 kg)   BMI 25.11 kg/m²   Estimated body mass index is 25.11 kg/m² as calculated from the following:    Height as of 3/16/23: 70\".    Weight as of this encounter: 175 lb (79.4 kg).    Gen: well developed, well nourished, no acute distress  HEENT: normocephalic  Heart; normal S1/S2, regular rate and rhythm  Lungs: clear to auscultation bilaterally  Extremities: no edema, peripheral pulses intact    Neck: supple, full range of motion; no carotid bruits    Neuro:  Mental status:  Orientation: Alert and oriented to person, place, time  Speech Fluent and conversational with some word finding difficulty    Follows simple, complex and crossed commands    CN: PERRL, EOMI with no nystagmus, VFF, mild R flattened nasolabial fold, sensation intact; tongue and palate midline, SCM intact, otherwise, CN 2-12 intact  Motor: mildly weaker  strength 5-/5 on R relative to L and slower fine motor movements on R side; R LE 5-/5 hip flexion, otherwise, 5/5 strength throughout, tone increased R UE minimally  Sensory: mildly reduced to pin in R face; otherwise, intact to light touch, vibration and proprioception throughout  Coord: FNF intact;  rapid alternating movements slower on R side; HKS slower on R side     Romberg: absent  Gait: mild hemiparetic gait, stiff with circumducting R leg and slightly dragging; able to walk on heels and toes but off balance with tandem; no cane or walker needed    Labs:  None new    Prior as noted below    Component      Latest Ref Rng & Units 5/27/2021   Cholesterol, Total      <200 mg/dL 216 (H)   HDL Cholesterol      40 - 59 mg/dL 63 (H)   Triglycerides      30 - 149 mg/dL 96   LDL Cholesterol Calc      <100 mg/dL 134 (H)   VLDL      0 - 30 mg/dL 19   NON HDL CHOL      <130 mg/dL 153 (H)   HEMOGLOBIN A1c      <5.7 % 5.9 (H)   ESTIMATED AVERAGE GLUCOSE      68 - 126 mg/dL 123     Imaging:  None new since last visit     Prior as noted below    MRI BRAIN (W+WO) (CPT=70553)    Result Date: 11/8/2021  CONCLUSION:   1. No acute intracranial abnormality identified.  2. There are 2 subcentimeter foci of T2 shine through noted right corona radiata and right occipital lobe that may be due to subacute to chronic infarcts.   3. There is hemosiderin staining associated with an old infarct centered in the left thalamus.   4. Mild chronic microvascular ischemic changes in the cerebral white matter and randall.  Small old left cerebellar and bilateral pontine infarcts are noted.         CT brain (10/12/2021):     FINDINGS:     VENTRICLES/SULCI:  Ventricles and sulci are normal in size.  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  This is stable from prior imaging.     INTRACRANIAL:  No acute intracranial hemorrhage, mass effect or midline shift.  Prior noted left thalamic intraparenchymal hemorrhage has resolved.  Prior noted remote ischemic lacunar infarct within the right corona radiata is stable/unchanged.  Mild   atrophic changes and periventricular/subcortical white matter disease noted, stable from prior imaging.   SINUSES:            No sign of acute sinusitis.     MASTOIDS:          No sign of acute inflammation.   SKULL:             No evidence for fracture or osseous abnormality.   OTHER:             None.                        Impression   CONCLUSION:     1. No acute intracranial process.   2. Stable atrophy and white matter disease.   3. Interval resolution of prior noted left thalamic parenchymal hemorrhage, seen on 5/28/2021.   4. Stable small remote lacunar infarct within the right corona radiata.   5. 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     Prior as noted below    CT STROKE BRAIN (NO IV)(CPT=70450)     Result Date: 5/27/2021  PROCEDURE:  CT STROKE BRAIN (NO IV)(CPT=70450)  COMPARISON:  None.  INDICATIONS:  Stroke  TECHNIQUE:  Noncontrast CT scanning is performed through the brain.  Dose reduction techniques were used. Dose information is transmitted to the ACR (American College of Radiology) NRDR (National Radiology Data Registry) which includes the Dose Index Registry.  PATIENT STATED HISTORY: (As transcribed by Technologist)  Code stroke.   FINDINGS: Ventricles and sulci are within normal limits.  There is no midline shift.  The basal cisterns are patent.  The gray-white matter differentiation is intact.  There is acute hemorrhage centered in the left thalamus with surrounding edema.  The hematoma measures approximately 3.3 x 1.5 x 2.3 cm.  Lorenzo cisterna magna.  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  Mild age-indeterminate microvascular ischemic changes elsewhere in the cerebral white matter.   There is no evident fracture.  The visualized paranasal sinuses and mastoid air cells are unremarkable.              CONCLUSION:   1. There is acute hemorrhage centered in the left thalamus with surrounding edema.  The hematoma measures approximately 3.3 x 1.5 x 2.3 cm.  2. 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  Mild age-indeterminate microvascular ischemic changes elsewhere in  the cerebral white matter.   Critical results were discussed with Dr. Li at 1909 hours on 5/27/2021. Critical results were read back.  Dictated by (CST): Tamir Shepherd MD on 5/27/2021 at 7:12 PM     Finalized by (CST): Tamir Shepherd MD on 5/27/2021 at 7:13 PM        CT STROKE CTA BRAIN/CTA NECK (W IV)(CPT=70496/61228)     Result Date: 5/27/2021  PROCEDURE:  CT STROKE CTA BRAIN/CTA NECK (W IV)(CPT=70496/06756)  COMPARISON:  None.  INDICATIONS:  eval for stroke  TECHNIQUE  Noncontrast CT scanning is performed through the brain and CT angiography of the head and neck were obtained with non-ionic contrast. MIP images were obtained. Curved planar reformats were performed through the carotid and vertebral arteries. All measurements obtained in this exam were performed using NASCET. Dose reduction techniques were used. Dose information is transmitted to the ACR (American College of Radiology) NRDR (National Radiology Data Registry) which includes the Dose Index Registry.  PATIENT STATED HISTORY:(As transcribed by Technologist)  Code stroke.   CONTRAST USED:  75cc of Omnipaque 350  FINDINGS:  Please see the separately dictated noncontrast CT of the brain performed on the same day for further details.  Reflux of contrast throughout multiple collaterals in the neck and chest may be due to venous stenosis in the left brachiocephalic vein.  This results in beam hardening artifact on the exam that partially obscures portions of the left cervical vasculature.  Mild mixed plaque in the cavernous and supraclinoid segments of the internal carotid arteries without hemodynamically significant narrowing.  An anterior communicating artery is seen.  The branches of the anterior cerebral and middle cerebral arteries are unremarkable.  Posterior communicating arteries are not well seen.  The branches of the posterior cerebral and superior cerebellar arteries are unremarkable.  The basilar artery has a normal course and caliber.   The right vertebral artery is dominant.  There is a 3-vessel aortic arch.  The origins of the branch vessels appear widely patent.  The bilateral subclavian arteries and innominate artery are unremarkable.  The common carotid arteries are widely patent.  Mild mixed plaque in both carotid bulbs.  There is no evidence of hemodynamically significant stenosis in the carotid bulbs by NASCET criteria.  The cervical internal carotid arteries are widely patent.  The vertebral arteries originate from the subclavian arteries.  The origins of the vertebral arteries are patent.  The cervical vertebral arteries are widely patent.  The right vertebral artery is dominant.  Scattered scarring/atelectasis in the partially imaged lungs.  Degenerative changes in the spine.             CONCLUSION:   1. No specific findings to suggest active bleeding.  2. No evidence of intracranial aneurysm, flow-limiting stenosis, or focal arterial occlusion.  3. No evidence of occlusion, dissection, or flow-limiting stenosis in the cervical vertebral or carotid arteries. No evidence of hemodynamically significant carotid stenosis by NASCET criteria.  Please see above for further details.   Critical results were discussed with Dr. Li at 1921 hours on 5/27/2021. Critical results were read back.  Dictated by (CST): Tamir Shepherd MD on 5/27/2021 at 7:38 PM     Finalized by (CST): Tamir Shepherd MD on 5/27/2021 at 7:43 PM       Echo (5/28/2021)  ----------------------------------------------------------------------------     Conclusions:     1. Left ventricle: The cavity size was normal. Wall thickness was mildly      increased. Systolic function was vigorous. The estimated ejection      fraction was 65-70%. No regional wall motion abnormalities. Doppler      parameters are consistent with abnormal left ventricular relaxation -      grade 1 diastolic dysfunction.   2. Pulmonary arteries: Systolic pressure could not be accurately estimated.      Impressions:  No previous study was available for comparison.   *          Impression/ Plan:  Vasquez Pacheco is a 68 year old male with PMHx significant for HTN, and HLD, who originally presented to [place] 5/27/2021, for new onset R sided weakness.  He denied any associated headache and was found to have left thalamic ICH. BP was elevated to 238/109 on arrival in ED and he had right sided weakness. He was admitted to NSICU for management of hypertension and started on cardene gtt. CTA was negative for vascular malformation.       Etiology likely due to hypertension.    Patient has improved in terms of his right hemiparesis with mild spasticity on the right side.  In addition, he stated he felt he was improving after rehab, but then had new onset of worsening right-sided weakness.  CT of the brain was done and did not demonstrate any new intracranial hemorrhage, or recurrent intracranial hemorrhage, or expansion of prior hemorrhage.  However, CT of the brain did demonstrate a likely old lacunar infarct in the right periventricular region.  This was present on imaging from 5/2021 and likely reflects prior history of ischemic infarct.  With patient's new event, MRI of the brain was done 11/2021 and showed 2 small subcentimeter areas of T2 shine through, not acute in R hemisphere corona radiata - may be subacute to chronic infarct - recommend be on ASA 81 mg daily as previously discussed.    1. Hemiparesis of right dominant side due to nontraumatic intracerebral hemorrhage (HCC)  As noted above    Return in about 1 year (around 3/4/2025).    Simeon Lobato MD, Neurology  [facility] [place]  Pager [phone]  3/4/2024

## 2024-03-05 NOTE — TELEPHONE ENCOUNTER
Form signed by provider.     Copy sent to scanning.    Original mailed to patient's home.    Pt informed form is in the mail.

## 2024-11-26 ENCOUNTER — TELEPHONE (OUTPATIENT)
Dept: NEUROLOGY | Facility: CLINIC | Age: 69
End: 2024-11-26

## 2024-11-26 NOTE — TELEPHONE ENCOUNTER
Document completed, placed at  for  by patient.  Document to be scanned by front staff when signed by patient in office.

## 2024-11-26 NOTE — TELEPHONE ENCOUNTER
Patient called is requesting a new Handicap Placard. Please complete the form and call patient when it is ready to be picked up and signed by patient. PSR will scan the signed document and handoff to patient. Vasquez's number is [phone]

## 2025-01-08 ENCOUNTER — TELEPHONE (OUTPATIENT)
Dept: NEUROLOGY | Facility: CLINIC | Age: 70
End: 2025-01-08

## 2025-01-08 NOTE — TELEPHONE ENCOUNTER
Received anterior tibial tendon repair clearance form from [facility].    Placed in nurses bin for review.

## 2025-01-09 NOTE — TELEPHONE ENCOUNTER
Per Dr. Lobato patient is cleared from a Neurological stand point for Right anterior tibial tendon repair. Patient is to hold Aspirin 81mg for 5 days since patient has not had any recent strokes.    Neurological clearance letter pended to be reviewed and signed by Provider.

## 2025-01-09 NOTE — TELEPHONE ENCOUNTER
Letter reviewed and signed by Dr. Lobato. Faxed to [phone]. Confirmation fax received. Copy sent to scan. See Med-rec dated 1/9/25.

## (undated) DEVICE — 1200CC GUARDIAN II: Brand: GUARDIAN

## (undated) DEVICE — Device: Brand: DEFENDO AIR/WATER/SUCTION AND BIOPSY VALVE

## (undated) DEVICE — FILTERLINE NASAL ADULT O2/CO2

## (undated) DEVICE — MEDI-VAC SUCTION HANDLE REGULAR CAPACITY: Brand: CARDINAL HEALTH

## (undated) DEVICE — TRAP SPEC REMOVAL ETRAP 15CM

## (undated) DEVICE — MEDI-VAC NON-CONDUCTIVE SUCTION TUBING: Brand: CARDINAL HEALTH

## (undated) DEVICE — ENDOSCOPY PACK - LOWER: Brand: MEDLINE INDUSTRIES, INC.

## (undated) DEVICE — SNARE 9MM 230CM 2.4MM EXACTO

## (undated) NOTE — LETTER
Neurological Pre-Operative Clearance Update      25      Vasquez Pacheco  :  1955      Please be advised that Vasquez Pacheco has been cleared by me, from a Neurological standpoint, for surgical procedure of Right Anterior Tibial Tendon Repair. Patient is to hold Aspirin 81 mg for 5 days prior to procedure. Vasquez has not had any recent strokes.    Should you have any further questions, please feel free to contact my office at [phone].    Sincerely,        Simeon Lobato MD, Neurology  [facility]

## (undated) NOTE — IP AVS SNAPSHOT
Patient Demographics     Address  [address] [place] [address] JUAN LUIS Jamison Age [address] Phone  [phone] [facility])  [phone] (Work)  [phone] (Mobile) *Preferred*      Emergency Contact(s)     Name Relation Home Work Mobile    Arco Spouse [phone] Medication Name Action Time Action Reason Comments    [id number] Enoxaparin Sodium (LOVENOX) 40 MG/0.4ML injection 40 mg 06/02/21 0839 Given              Recent Vital Signs       Most Recent Value   Vitals  131/69 Filed at 06/03/2021 0100   Pulse  60 Filed arm and he was leaning towards his right as well. He was brought into the ER as a code stroke his symptoms started approximately 1 hour prior to arrival to the ER. He had a CT scan done in the ER which showed an acute left thalamic hemorrhage.   Patient h aphasia[TP.2]  Musculoskeletal: Moves all extremities. Extremities: No edema or cyanosis. Integument: No rashes or lesions. Psychiatric: Appropriate mood and affect. Diagnostic Data:      Labs:  Recent Labs   Lab 05/27/21  1858   WBC 8.1   HGB 15. Service: 5/28/2021  1:05 PM Status: Signed    : Micky Earl MD (Physician)     Consult Orders    1. Consult to Physical Medicine Rehab [[id number]] ordered by Tanya Cleveland at 05/28/21 6437 . [facility] was 65-70%. No regional wall motion abnormalities. Doppler      parameters are consistent with abnormal left ventricular relaxation -      grade 1 diastolic dysfunction. 2. Pulmonary arteries: Systolic pressure could not be accurately estimated.      aTnya Boland HCl (TRANDATE) injection 10 mg, 10 mg, Intravenous, Q10 Min PRN  hydrALAzine HCl (APRESOLINE) injection 10 mg, 10 mg, Intravenous, Q2H PRN  acetaminophen (TYLENOL) tab 650 mg, 650 mg, Oral, Q4H PRN   Or  acetaminophen (TYLENOL) 650 MG rectal suppository 65 pulse 77, temperature 98.8 °F (37.1 °C), temperature source Temporal, resp. rate (!) 9, height 5' 7\" (1.702 m), weight 183 lb 3.2 oz (83.1 kg), SpO2 99 %.     Intake/Output Summary (Last 24 hours) at 5/28/2021 1305  Last data filed at 5/28/2021 1130  Gross recommended to maximize patient's independence, provide care giver training, and evaluate for home equipment needs with ELOS 2-3 weeks.      Medical necessity includes  thromboembolic risk, fall risk, seizure risk, medication management, bleeding risk,  pre significant past medical history comes to the ER with complaints of right arm weakness.   He states that he was working out at [employer] today and during his workout he was having a difficult time with his right arm and he was leaning towards his right directed by your doctor or nurse    Bring a paper prescription for each of these medications  atorvastatin 10 MG Tabs  lisinopril 10 MG Tabs[CM. 2]         [place]: [place]    Follow-up appointment:[CM.1]   Jarad Draper MD  [facility] Joen August, Rea Holter, DO on 6/2/2021  4:45 PM  MP.2 - Kareem Dennison, DO on 6/2/2021  4:46 PM                        Physical Therapy Notes (last 72 hours) (Notes from 5/31/2021  7:40 AM through 6/3/2021  7:40 AM)      Physical Therapy Note signed by Cindy Pitt Restriction: None                PAIN ASSESSMENT   Ratin          BALANCE                                                                                                                     Static Sitting: Fair  Dynamic Sitting: Fair -           Static intermittent posterior or R lateral loss of balance, and required Mod A to prevent fall. Pt able to complete standing reaching activities - required Mod A from writer to maintain upright during R cross body reaching (see OT note).      Pt still requiring M to/from [facility] at assistance level: minimum assistance      Goal #3 Patient is able to ambulate 50 feet with assist device: mary ann at assistance level: moderate assistance      Goal #4  Pt will be ind/mod I in HEP for B LE while seated/supine     Goal #5 right back   • Dysplastic nevus 2/23/16    right upper back   • Melanoma in situ ([facility] [address] 3/26/2009    left posterior shoulder       Past Surgical History  No past surgical history on file.     SUBJECTIVE[BR.1]  Pt denies pain.[BR.2]     Patient’s self-stated maintain wb during push off, cga. Repeated 5 times during session. Standing balance maintained with cga, cues for proper quad activation on RLE.    Gait: pt ambulated with manuel walker and mod to max assist of one to maintain balance, frequent cues for gait demonstrate transfers EOB to/from [facility] at assistance level: minimum assistance      Goal #3 Patient is able to ambulate 50 feet with assist device: hemicane at assistance level: moderate assistance      Goal #4  Pt will be ind/mod I in HEP for B LE while se aphasia[MM. 3]      Past Medical History[MM. 1]  Past Medical History:   Diagnosis Date   • Basal cell carcinoma 2009    right chest   • Dysplastic nevus 11/15/13    right back   • Dysplastic nevus 2/23/16    right upper back   • Melanoma in situ [facility]) 3/26/ reach back with RUE to sit in chair, he demonstrated understanding . Patient ambulated with PT in dyson without device, noted to require max A for approx 10 feet.   OT engaged patient in standing RUE reaching and placing of cones (cups) with trunk rotation motor coordination activities; Compensatory technique education  Rehab Potential : Good  Frequency (Obs): 3-5x/week[MM. 3]      OT Goals: progressing 6/2/21  ADL Goals   Patient will perform eating: with supervision  Patient will perform grooming: with super hematoma measures approximately 3.3 x 1.5 x 2.3 cm.    2. 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 Mild age-indeterminate microvascular ischemic changes elsewhere in the cerebral white matter.           Problem List  Principal Problem:    Thalamic he was educated on OT activities for session, understanding was voiced. Supine to sit T/F supervision and minimally  increased effort/time.   OT engaged patient in seated trunk lean for increased WB and proprioception, followed by clasped hand shoulder flexio reeducation; Fine motor coordination activities; Compensatory technique education  Rehab Potential : Good  Frequency (Obs): 3-5x/week      OT Goals:[MM.1] progressing 6/1/21[MM. 2]  ADL Goals   Patient will perform eating: with supervision  Patient will perfo Discussed with patient to improve his awareness of this and to attempt to convey his intended message though also to balance with limiting frustration to a tolerable level. He verbalized understanding.   Patient ability to participate in conversation nasir Active Goals        Problem: Patient/Family Goals    Goal Priority Disciplines Outcome Interventions   Patient/Family Long Term Goal     Interdisciplinary Adequate for Discharge    Description: Patient's Long Term Goal: return to baseline speech and mo

## (undated) NOTE — IP AVS SNAPSHOT
[address]            (For Outpatient Use Only) Initial Admit Date: 5/27/2021   Inpt/Obs Admit Date: Inpt: 5/27/21 / Obs: N/A   Discharge Date:    Berry Sorto:  [de-identified]   MRN: [de-identified]   CSN: [id number]   CEID: [id number] Subscriber Name:  Ramsey Canadapool :    Subscriber ID:  Pt Rel to Subscriber:    Hospital Account Financial Class: Managed Care    Jeanine 3, 2021